# Patient Record
Sex: FEMALE | Race: BLACK OR AFRICAN AMERICAN | NOT HISPANIC OR LATINO | URBAN - METROPOLITAN AREA
[De-identification: names, ages, dates, MRNs, and addresses within clinical notes are randomized per-mention and may not be internally consistent; named-entity substitution may affect disease eponyms.]

---

## 2021-06-07 ENCOUNTER — EMERGENCY (EMERGENCY)
Facility: HOSPITAL | Age: 41
LOS: 1 days | Discharge: HOME | End: 2021-06-07
Attending: EMERGENCY MEDICINE | Admitting: EMERGENCY MEDICINE
Payer: MEDICAID

## 2021-06-07 VITALS
OXYGEN SATURATION: 100 % | TEMPERATURE: 98 F | SYSTOLIC BLOOD PRESSURE: 168 MMHG | RESPIRATION RATE: 18 BRPM | DIASTOLIC BLOOD PRESSURE: 113 MMHG | HEART RATE: 94 BPM

## 2021-06-07 VITALS
RESPIRATION RATE: 18 BRPM | DIASTOLIC BLOOD PRESSURE: 87 MMHG | SYSTOLIC BLOOD PRESSURE: 138 MMHG | OXYGEN SATURATION: 96 % | HEART RATE: 70 BPM

## 2021-06-07 PROCEDURE — 99284 EMERGENCY DEPT VISIT MOD MDM: CPT

## 2021-06-07 RX ORDER — ACETAMINOPHEN 500 MG
650 TABLET ORAL ONCE
Refills: 0 | Status: COMPLETED | OUTPATIENT
Start: 2021-06-07 | End: 2021-06-07

## 2021-06-07 RX ORDER — METOCLOPRAMIDE HCL 10 MG
10 TABLET ORAL ONCE
Refills: 0 | Status: COMPLETED | OUTPATIENT
Start: 2021-06-07 | End: 2021-06-07

## 2021-06-07 RX ADMIN — Medication 650 MILLIGRAM(S): at 20:17

## 2021-06-07 RX ADMIN — Medication 10 MILLIGRAM(S): at 20:17

## 2021-06-07 NOTE — ED PROVIDER NOTE - NSFOLLOWUPINSTRUCTIONS_ED_ALL_ED_FT
-You were seen in the Emergency Department (ED) for headaches. Lab and imaging results, if performed, were discussed with you along with your discharge diagnosis.    FOLLOW-UP:  -Please follow up with your neurologist regarding your headaches in the next 5 days. A number has been provided for your convenience.   -If you do not have a PMD, please call [phone] to find one convenient for you or call our clinic at [phone].    MEDICATIONS:  -Continue all other prescribed medicine, IF ANY, as per your primary care doctor's (PMD) recommendations.    PAIN CONTROL:  -Please take over the counter Tylenol (also known as acetaminophen) 650mg every 6 hours or Ibuprofen (also known as motrin, advil) 600mg every 8 hour for your pain, IF ANY, unless you are not supposed to for any reason.  -Rest, stay hydrated with plenty of fluids (drink at least 2 Liters or 64 Ounces of water each day UNLESS you are supposed to restrict fluids or ANY reason.    RETURN PRECAUTIONS:  -Please return to the Emergency Department if you experience ANY new or concerning symptoms, such as, but not limited to: worsening pain, large amount of bleeding, passing out, fever >100.F, shaking chills, inability to see or new double vision, chest pain, difficulty breathing, diffuse abdominal pain, unable to eat or drink, continuous vomiting or diarrhea, unable to move or feel part of your body

## 2021-06-07 NOTE — ED ADULT NURSE NOTE - OBJECTIVE STATEMENT
Pt received to rm 23 c/o hypertension and headache x 3 days. AxOx4 and ambulatory at baseline. Pt went to urgent care today and was sent to ED for chief complaint. Pt appears comfortable, in NAD, respirations even/unlabored, hypertensive at this time, MD aware. Pt c/o headache and mild nausea at this time. Pt denies dizziness, chest pain, SOB, vomiting, diarrhea, fever, chills, cough. Pt denies any hx of HTN. Pt medicated as per orders, will reassess, safety measures maintained, will continue to monitor.

## 2021-06-07 NOTE — ED ADULT TRIAGE NOTE - CHIEF COMPLAINT QUOTE
Pt presents to ED ambulatory from home with c/o headache and nausea x 3 days. Pt was seen at urgent care today and found to be hypertensive and advised to come to ED for further eval. Pt denies past medical hx.

## 2021-06-07 NOTE — ED PROVIDER NOTE - OBJECTIVE STATEMENT
39 yo F with no reported pmhx presents to the ED with headaches that started 3 days ago. Her headache is localized over her frontal area and is described as a pulsatile pain that does not radiate anywhere else. She denies any fnd or numbness or tingling. She denies any vison changes. She was seen at the  earlier and was sent to the ED. Her headache was gradual in onset and slowly worsened over yesterday. She denies any other symptoms at bedside,.

## 2021-06-07 NOTE — ED PROVIDER NOTE - PATIENT PORTAL LINK FT
You can access the FollowMyHealth Patient Portal offered by [facility] by registering at the following website: [email or web address]. By joining [facility]’s FollowMyHealth portal, you will also be able to view your health information using other applications (apps) compatible with our system.

## 2021-06-07 NOTE — ED PROVIDER NOTE - PHYSICAL EXAMINATION
GENERAL: no acute distress, non-toxic appearing  HEAD: normocephalic, atraumatic  HEENT: normal conjunctiva, oral mucosa moist, neck supple  CARDIAC: regular rate and rhythm, normal S1 and S2,  no appreciable murmurs  PULM: clear to ascultation bilaterally, no crackles, rales, rhonchi, or wheezing  GI: abdomen nondistended, soft, nontender, no guarding or rebound tenderness  : no CVA tenderness, no suprapubic tenderness    NEURO: CN2-12 grossly intact, A&Ox4, MS +5/5 in UE and LE BL, finger to nose smooth and rapid, gross sensation intact in UE and LE BL, gait smooth and coordinated, negative rhomberg, negative pronator drift    MSK: no visible deformities, no peripheral edema, calf tenderness/redness/swelling  SKIN: no visible rashes, dry, well-perfused  PSYCH: appropriate mood and affect

## 2021-06-07 NOTE — ED PROVIDER NOTE - PROGRESS NOTE DETAILS
sylvia: symptomatic improvement of headache. BP improved spontaneously without BP intervention. pt to be f/u with neuro. neuro referral preferred.

## 2021-06-07 NOTE — ED PROVIDER NOTE - CLINICAL SUMMARY MEDICAL DECISION MAKING FREE TEXT BOX
41 yo F with no reported pmhx presents to the ED with headaches that started 3 days ago. Her headache is localized over her frontal area and is described as a pulsatile pain that does not radiate anywhere else. progressively worsening. no neurological complaints. Vitals significant for HTN to the 160s systolic. neurological exam is benign. no acute distress. low concerns for hypertensive emergency given BP and lack of CP, SOB. headaches is described more as a migraine. will order meds, reassess

## 2021-06-07 NOTE — ED PROVIDER NOTE - ADDITIONAL NOTES AND INSTRUCTIONS:
Please excuse Faiza Singh from work/school as he/she was being evaluated in the Emergency Room at [facility].

## 2021-06-07 NOTE — ED PROVIDER NOTE - ATTENDING CONTRIBUTION TO CARE
nikki presents to the ED with headaches that started 3 days ago. Her headache is localized over her frontal area and is described as a pulsatile pain that does not radiate anywhere else. She denies any fnd or numbness or tingling. She denies any vison changes. She was seen at the  earlier and was sent to the ED. Her headache was gradual in onset and slowly worsened over yesterday. She denies any other symptoms at bedside,.

## 2021-06-07 NOTE — ED PROVIDER NOTE - NSFOLLOWUPCLINICS_GEN_ALL_ED_FT
[facility]  Neurology  [address] [place] [address] - [address] [place] [address]  Phone: [phone]  Fax:   Follow Up Time: 4-6 Days

## 2021-09-13 NOTE — ED PROVIDER NOTE - IV ALTEPLASE EXCL ABS HIDDEN
S/W patient about referral for colon screening appt  Pt  Will be having an upcoming surgery (which she is awaiting a date to be scheduled) in which Dr Coral Salazar wrote in his notes that he would like her to have the colonoscopy done before surgery  Patient is questioning whether or not a call has been made to schedule the colonoscopy  Please call [phone] to discuss 
show

## 2022-06-04 ENCOUNTER — EMERGENCY (EMERGENCY)
Facility: HOSPITAL | Age: 42
LOS: 1 days | Discharge: HOME | End: 2022-06-04
Admitting: EMERGENCY MEDICINE
Payer: MEDICAID

## 2022-06-04 VITALS — DIASTOLIC BLOOD PRESSURE: 96 MMHG | SYSTOLIC BLOOD PRESSURE: 144 MMHG

## 2022-06-04 VITALS
RESPIRATION RATE: 16 BRPM | SYSTOLIC BLOOD PRESSURE: 156 MMHG | HEART RATE: 83 BPM | TEMPERATURE: 97 F | DIASTOLIC BLOOD PRESSURE: 99 MMHG | OXYGEN SATURATION: 99 %

## 2022-06-04 PROBLEM — Z78.9 OTHER SPECIFIED HEALTH STATUS: Chronic | Status: ACTIVE | Noted: 2021-06-07

## 2022-06-04 LAB
ALBUMIN SERPL ELPH-MCNC: 4.2 G/DL — SIGNIFICANT CHANGE UP (ref 3.3–5)
ALP SERPL-CCNC: 71 U/L — SIGNIFICANT CHANGE UP (ref 40–120)
ALT FLD-CCNC: 14 U/L — SIGNIFICANT CHANGE UP (ref 4–33)
ANION GAP SERPL CALC-SCNC: 11 MMOL/L — SIGNIFICANT CHANGE UP (ref 7–14)
APPEARANCE UR: CLEAR — SIGNIFICANT CHANGE UP
APTT BLD: 25.1 SEC — LOW (ref 27–36.3)
AST SERPL-CCNC: 14 U/L — SIGNIFICANT CHANGE UP (ref 4–32)
BACTERIA # UR AUTO: NEGATIVE — SIGNIFICANT CHANGE UP
BASOPHILS # BLD AUTO: 0.04 K/UL — SIGNIFICANT CHANGE UP (ref 0–0.2)
BASOPHILS NFR BLD AUTO: 0.8 % — SIGNIFICANT CHANGE UP (ref 0–2)
BILIRUB SERPL-MCNC: 0.2 MG/DL — SIGNIFICANT CHANGE UP (ref 0.2–1.2)
BILIRUB UR-MCNC: NEGATIVE — SIGNIFICANT CHANGE UP
BLD GP AB SCN SERPL QL: NEGATIVE — SIGNIFICANT CHANGE UP
BUN SERPL-MCNC: 10 MG/DL — SIGNIFICANT CHANGE UP (ref 7–23)
CALCIUM SERPL-MCNC: 9.2 MG/DL — SIGNIFICANT CHANGE UP (ref 8.4–10.5)
CHLORIDE SERPL-SCNC: 103 MMOL/L — SIGNIFICANT CHANGE UP (ref 98–107)
CO2 SERPL-SCNC: 24 MMOL/L — SIGNIFICANT CHANGE UP (ref 22–31)
COLOR SPEC: COLORLESS — SIGNIFICANT CHANGE UP
CREAT SERPL-MCNC: 1.08 MG/DL — SIGNIFICANT CHANGE UP (ref 0.5–1.3)
DIFF PNL FLD: ABNORMAL
EGFR: 66 ML/MIN/1.73M2 — SIGNIFICANT CHANGE UP
EOSINOPHIL # BLD AUTO: 0.07 K/UL — SIGNIFICANT CHANGE UP (ref 0–0.5)
EOSINOPHIL NFR BLD AUTO: 1.5 % — SIGNIFICANT CHANGE UP (ref 0–6)
EPI CELLS # UR: 1 /HPF — SIGNIFICANT CHANGE UP (ref 0–5)
GLUCOSE SERPL-MCNC: 101 MG/DL — HIGH (ref 70–99)
GLUCOSE UR QL: NEGATIVE — SIGNIFICANT CHANGE UP
HCT VFR BLD CALC: 35.1 % — SIGNIFICANT CHANGE UP (ref 34.5–45)
HGB BLD-MCNC: 10.6 G/DL — LOW (ref 11.5–15.5)
HYALINE CASTS # UR AUTO: 2 /LPF — SIGNIFICANT CHANGE UP (ref 0–7)
IANC: 2.2 K/UL — SIGNIFICANT CHANGE UP (ref 1.8–7.4)
IMM GRANULOCYTES NFR BLD AUTO: 0.2 % — SIGNIFICANT CHANGE UP (ref 0–1.5)
INR BLD: 0.99 RATIO — SIGNIFICANT CHANGE UP (ref 0.88–1.16)
KETONES UR-MCNC: NEGATIVE — SIGNIFICANT CHANGE UP
LEUKOCYTE ESTERASE UR-ACNC: NEGATIVE — SIGNIFICANT CHANGE UP
LYMPHOCYTES # BLD AUTO: 2 K/UL — SIGNIFICANT CHANGE UP (ref 1–3.3)
LYMPHOCYTES # BLD AUTO: 41.7 % — SIGNIFICANT CHANGE UP (ref 13–44)
MCHC RBC-ENTMCNC: 23.5 PG — LOW (ref 27–34)
MCHC RBC-ENTMCNC: 30.2 GM/DL — LOW (ref 32–36)
MCV RBC AUTO: 77.8 FL — LOW (ref 80–100)
MONOCYTES # BLD AUTO: 0.48 K/UL — SIGNIFICANT CHANGE UP (ref 0–0.9)
MONOCYTES NFR BLD AUTO: 10 % — SIGNIFICANT CHANGE UP (ref 2–14)
NEUTROPHILS # BLD AUTO: 2.2 K/UL — SIGNIFICANT CHANGE UP (ref 1.8–7.4)
NEUTROPHILS NFR BLD AUTO: 45.8 % — SIGNIFICANT CHANGE UP (ref 43–77)
NITRITE UR-MCNC: NEGATIVE — SIGNIFICANT CHANGE UP
NRBC # BLD: 0 /100 WBCS — SIGNIFICANT CHANGE UP
NRBC # FLD: 0 K/UL — SIGNIFICANT CHANGE UP
PH UR: 7.5 — SIGNIFICANT CHANGE UP (ref 5–8)
PLATELET # BLD AUTO: 192 K/UL — SIGNIFICANT CHANGE UP (ref 150–400)
POTASSIUM SERPL-MCNC: 4 MMOL/L — SIGNIFICANT CHANGE UP (ref 3.5–5.3)
POTASSIUM SERPL-SCNC: 4 MMOL/L — SIGNIFICANT CHANGE UP (ref 3.5–5.3)
PROT SERPL-MCNC: 7 G/DL — SIGNIFICANT CHANGE UP (ref 6–8.3)
PROT UR-MCNC: ABNORMAL
PROTHROM AB SERPL-ACNC: 11.5 SEC — SIGNIFICANT CHANGE UP (ref 10.5–13.4)
RBC # BLD: 4.51 M/UL — SIGNIFICANT CHANGE UP (ref 3.8–5.2)
RBC # FLD: 15.2 % — HIGH (ref 10.3–14.5)
RBC CASTS # UR COMP ASSIST: SIGNIFICANT CHANGE UP /HPF (ref 0–4)
RH IG SCN BLD-IMP: POSITIVE — SIGNIFICANT CHANGE UP
SODIUM SERPL-SCNC: 138 MMOL/L — SIGNIFICANT CHANGE UP (ref 135–145)
SP GR SPEC: 1.01 — SIGNIFICANT CHANGE UP (ref 1–1.05)
UROBILINOGEN FLD QL: SIGNIFICANT CHANGE UP
WBC # BLD: 4.8 K/UL — SIGNIFICANT CHANGE UP (ref 3.8–10.5)
WBC # FLD AUTO: 4.8 K/UL — SIGNIFICANT CHANGE UP (ref 3.8–10.5)
WBC UR QL: 3 /HPF — SIGNIFICANT CHANGE UP (ref 0–5)

## 2022-06-04 PROCEDURE — 76830 TRANSVAGINAL US NON-OB: CPT | Mod: 26

## 2022-06-04 PROCEDURE — 99285 EMERGENCY DEPT VISIT HI MDM: CPT

## 2022-06-04 NOTE — ED ADULT TRIAGE NOTE - CHIEF COMPLAINT QUOTE
Pt c/o heavy menses x 12 days. reports going through 2pads/hr endorsing dizziness and headache. denies sob, cp, fever, chills, abd cramping. Denies pmhx.

## 2022-06-04 NOTE — ED PROVIDER NOTE - NSFOLLOWUPINSTRUCTIONS_ED_ALL_ED_FT
Uterine Fibroids     Uterine fibroids are lumps of tissue (tumors) in the womb (uterus). Fibroids are not cancerous. Most women with this condition do not need treatment.  Sometimes, fibroids can make it harder to have children. If this happens, you may need surgery to take out the fibroids.      What are the causes?  The cause of this condition is not known.      What increases the risk?  •You are in your 30s or 40s and have not gone through menopause. Menopause is when you have not had a menstrual period for 12 months.    •Having a history of fibroids in your family.    •You are of  descent.    •You started your period at age 10 or younger.    •You have not given birth.    •You are overweight or very overweight.      What are the signs or symptoms?  •Bleeding between menstrual periods.  •Heavy bleeding during your menstrual period.  •Pain in the area between your hips.  •Needing to pee (urinate) right away or more often than usual.  •Not being able to have children (infertility).  •Not being able to stay pregnant (miscarriage).      Many women do not have symptoms.       How is this treated?    Treatment may include:  •Follow-up visits with your doctor to check your fibroids for any changes.  •Medicines to help with pain, such as aspirin or ibuprofen.  •Hormone therapy. This may be given as a pill, in a shot, or with a type of birth control device called an IUD.  •Surgery that would do one of these things:  •Take out the fibroids. This may be done if you want to become pregnant.  •Take out the womb (hysterectomy).  •Stop the blood flow to the fibroids.      Follow these instructions at home:  Medicines   •Take over-the-counter and prescription medicines only as told by your doctor.  •Ask your doctor if you should:  •Take iron pills.  •Eat more foods that have a lot of iron in them, such as dark green, leafy vegetables.      Managing pain      If told, put heat on your back or belly. Do this as often as told by your doctor. Use the heat source that your doctor recommends, such as a moist heat pack or a heating pad. To do this:  •Put a towel between your skin and the heat pack or pad.    •Leave the heat on for 20–30 minutes.    •Take off the heat if your skin turns bright red. This is very important. If you cannot feel pain, heat, or cold, you may have a greater risk of getting burned.      General instructions   •Tell your doctor about any changes to your menstrual period, such as:  •Heavy bleeding that needs a change of tampons or pads more than normal.  •A change in how many days your period lasts.  •A change in symptoms that come with your period. This might be belly cramps or back pain.  •Keep all follow-up visits.     Contact a doctor if:  •You have pain that does not get better with medicine or heat. This may include pain or cramps in:  •The area between your hip bones.  •Your back.  •Your belly.  •You have new bleeding between your periods.  •You have more bleeding during or between your periods.  •You feel very tired or weak.  •You feel dizzy.        Get help right away if:  •You faint.  •You have pain in the area between your hip bones that gets worse.  •You have bleeding that soaks a tampon or pad in 30 minutes or less.        Summary  •Uterine fibroids are lumps of tissue (tumors) in your womb. They are not cancerous.  •Medicines such as aspirin or ibuprofen may be used to help with pain.  •Contact a doctor if you have pain or cramps that do not get better with medicine.  •Know the symptoms for when you should get help right away.      This information is not intended to replace advice given to you by your health care provider. Make sure you discuss any questions you have with your health care provider.

## 2022-06-04 NOTE — ED ADULT TRIAGE NOTE - PAIN RATING/NUMBER SCALE (0-10): ACTIVITY
Detail Level: Zone
Recommendation Preamble: The following recommendations were made during the visit:
Recommendations (Free Text): Aquaphor or Cerave healing ointment at least every 2 hours. Avoid lip licking and cinnamon. Continue with regular [employer]. Use straws.
4

## 2022-06-04 NOTE — ED PROVIDER NOTE - PATIENT PORTAL LINK FT
You can access the FollowMyHealth Patient Portal offered by [facility] by registering at the following website: [email or web address]. By joining [facility]’s FollowMyHealth portal, you will also be able to view your health information using other applications (apps) compatible with our system.

## 2022-06-04 NOTE — ED ADULT NURSE NOTE - OBJECTIVE STATEMENT
Pt presents to intake rm 12, alert&orientedx4, amb, no pmhx coming to ED for vaginal bleeding since 5/24, has been using two soiled pads/hr. Denies any blood clots passing. Pt also endorses dizziness and headache. Denies prior hx of PRBC. 20g IV established on right ac, labs drawn and sent. Pending US

## 2022-06-04 NOTE — ED PROVIDER NOTE - PROGRESS NOTE DETAILS
AISSATOU Stringer:  Using  [id number] [facility] pt counseled regarding results and signs/symptoms warranting return. Also discussed elevated BP readings and the need for f/u with PCP . She verbalized understanding all questions answered.

## 2022-06-04 NOTE — ED PROVIDER NOTE - PHYSICAL EXAMINATION
CONSTITUTIONAL: Well-appearing; well-nourished; in no apparent distress. Non-toxic appearing.   NEURO: Alert & oriented. Gait steady without assistance. Sensory and motor functions are grossly intact.  PSYCH: Mood appropriate. Thought processes intact.   NECK: Supple  CARD: Regular rate and rhythm, no murmurs  RESP: No accessory muscle use; breath sounds clear and equal bilaterally; no wheezes, rhonchi, or rales     ABD: Soft; non-distended; non-tender. No guarding or rebound.  : No CVA tenderness. There is small amount of blood coming from the cervix. Visualized portions of the cervix are pink without obvious abnormalities or lesions. No CMT or adnexal tenderness b/l.   MUSCULOSKELETAL/EXTREMITIES: FROM in all four extremities; no extremity edema.  SKIN: Warm; dry; no apparent lesions or exudate

## 2022-06-04 NOTE — ED PROVIDER NOTE - NSFOLLOWUPCLINICS_GEN_ALL_ED_FT
[facility]  Gynceology/OB  [address] [place] [address]  Phone: [phone]  Fax:

## 2022-06-04 NOTE — ED PROVIDER NOTE - OBJECTIVE STATEMENT
Hx translated using video  [id number]. 42 y/o female with no PMHx presents c/o vaginal bleeding since 5/24. Pt states it started as her normal menses but it never stopped. Pt notes she needs to use 2 oversized pads daily. Associated with dizziness. Pt tried to see an OB but was unable. No hx of fibroids or ovarian cyst or DUB stating this is her 1st time. Denies associated abdominal pain/cramping, large clots, NVD, dysuria, fever, chills, or flank pain. No other voiced complaints.

## 2022-06-04 NOTE — ED PROVIDER NOTE - NS ED ROS FT
ROS:  GENERAL: No fever, no chills  CARDIAC: no chest pain  PULMONARY: no cough, no shortness of breath  GI: As per HPI   : As per HPI   SKIN: no rashes  NEURO: As per HPI (+) dizziness  MSK: No joint pain  All other systems reviewed as negative.

## 2022-06-04 NOTE — ED PROVIDER NOTE - CLINICAL SUMMARY MEDICAL DECISION MAKING FREE TEXT BOX
40 y/o female with no PMHx presents c/o vaginal bleeding since 5/24. Plan to r/o anemia, pregnancy/pregnancy pregnancy, ovarian cyst/torsions, and/or DUB. Likely dispo to f/u with OB. 40 y/o female with no PMHx presents c/o vaginal bleeding since 5/24. Plan to r/o anemia, pregnancy/OB related emergencies, ovarian cyst/torsions, and/or DUB. Likely dispo to f/u with OB as DUB or fibroid fit clinical picture.

## 2024-08-17 ENCOUNTER — EMERGENCY (EMERGENCY)
Facility: HOSPITAL | Age: 44
LOS: 1 days | Discharge: HOME | End: 2024-08-17
Admitting: EMERGENCY MEDICINE
Payer: COMMERCIAL

## 2024-08-17 VITALS
RESPIRATION RATE: 18 BRPM | OXYGEN SATURATION: 98 % | HEART RATE: 70 BPM | TEMPERATURE: 98 F | SYSTOLIC BLOOD PRESSURE: 117 MMHG | DIASTOLIC BLOOD PRESSURE: 73 MMHG

## 2024-08-17 VITALS
OXYGEN SATURATION: 100 % | SYSTOLIC BLOOD PRESSURE: 127 MMHG | RESPIRATION RATE: 18 BRPM | TEMPERATURE: 98 F | HEART RATE: 92 BPM | DIASTOLIC BLOOD PRESSURE: 83 MMHG | WEIGHT: 214.95 LBS

## 2024-08-17 PROBLEM — Z78.9 OTHER SPECIFIED HEALTH STATUS: Chronic | Status: INACTIVE | Noted: 2021-06-07 | Resolved: 2024-08-17

## 2024-08-17 LAB
ADD ON TEST-SPECIMEN IN LAB: SIGNIFICANT CHANGE UP
ALBUMIN SERPL ELPH-MCNC: 4.2 G/DL — SIGNIFICANT CHANGE UP (ref 3.3–5)
ALP SERPL-CCNC: 92 U/L — SIGNIFICANT CHANGE UP (ref 40–120)
ALT FLD-CCNC: 12 U/L — SIGNIFICANT CHANGE UP (ref 4–33)
ANION GAP SERPL CALC-SCNC: 11 MMOL/L — SIGNIFICANT CHANGE UP (ref 7–14)
AST SERPL-CCNC: 14 U/L — SIGNIFICANT CHANGE UP (ref 4–32)
BASOPHILS # BLD AUTO: 0.06 K/UL — SIGNIFICANT CHANGE UP (ref 0–0.2)
BASOPHILS NFR BLD AUTO: 1 % — SIGNIFICANT CHANGE UP (ref 0–2)
BILIRUB SERPL-MCNC: 0.3 MG/DL — SIGNIFICANT CHANGE UP (ref 0.2–1.2)
BUN SERPL-MCNC: 12 MG/DL — SIGNIFICANT CHANGE UP (ref 7–23)
CALCIUM SERPL-MCNC: 9.5 MG/DL — SIGNIFICANT CHANGE UP (ref 8.4–10.5)
CHLORIDE SERPL-SCNC: 103 MMOL/L — SIGNIFICANT CHANGE UP (ref 98–107)
CO2 SERPL-SCNC: 25 MMOL/L — SIGNIFICANT CHANGE UP (ref 22–31)
CREAT SERPL-MCNC: 1.02 MG/DL — SIGNIFICANT CHANGE UP (ref 0.5–1.3)
EGFR: 70 ML/MIN/1.73M2 — SIGNIFICANT CHANGE UP
EOSINOPHIL # BLD AUTO: 0.08 K/UL — SIGNIFICANT CHANGE UP (ref 0–0.5)
EOSINOPHIL NFR BLD AUTO: 1.4 % — SIGNIFICANT CHANGE UP (ref 0–6)
GLUCOSE SERPL-MCNC: 86 MG/DL — SIGNIFICANT CHANGE UP (ref 70–99)
HCT VFR BLD CALC: 36 % — SIGNIFICANT CHANGE UP (ref 34.5–45)
HGB BLD-MCNC: 11.2 G/DL — LOW (ref 11.5–15.5)
IANC: 3.11 K/UL — SIGNIFICANT CHANGE UP (ref 1.8–7.4)
IMM GRANULOCYTES NFR BLD AUTO: 0.2 % — SIGNIFICANT CHANGE UP (ref 0–0.9)
LYMPHOCYTES # BLD AUTO: 2.08 K/UL — SIGNIFICANT CHANGE UP (ref 1–3.3)
LYMPHOCYTES # BLD AUTO: 36.2 % — SIGNIFICANT CHANGE UP (ref 13–44)
MCHC RBC-ENTMCNC: 23.8 PG — LOW (ref 27–34)
MCHC RBC-ENTMCNC: 31.1 GM/DL — LOW (ref 32–36)
MCV RBC AUTO: 76.6 FL — LOW (ref 80–100)
MONOCYTES # BLD AUTO: 0.41 K/UL — SIGNIFICANT CHANGE UP (ref 0–0.9)
MONOCYTES NFR BLD AUTO: 7.1 % — SIGNIFICANT CHANGE UP (ref 2–14)
NEUTROPHILS # BLD AUTO: 3.11 K/UL — SIGNIFICANT CHANGE UP (ref 1.8–7.4)
NEUTROPHILS NFR BLD AUTO: 54.1 % — SIGNIFICANT CHANGE UP (ref 43–77)
NRBC # BLD: 0 /100 WBCS — SIGNIFICANT CHANGE UP (ref 0–0)
NRBC # FLD: 0 K/UL — SIGNIFICANT CHANGE UP (ref 0–0)
PLATELET # BLD AUTO: 257 K/UL — SIGNIFICANT CHANGE UP (ref 150–400)
POTASSIUM SERPL-MCNC: 4.2 MMOL/L — SIGNIFICANT CHANGE UP (ref 3.5–5.3)
POTASSIUM SERPL-SCNC: 4.2 MMOL/L — SIGNIFICANT CHANGE UP (ref 3.5–5.3)
PROT SERPL-MCNC: 8.1 G/DL — SIGNIFICANT CHANGE UP (ref 6–8.3)
RBC # BLD: 4.7 M/UL — SIGNIFICANT CHANGE UP (ref 3.8–5.2)
RBC # FLD: 14.9 % — HIGH (ref 10.3–14.5)
SODIUM SERPL-SCNC: 139 MMOL/L — SIGNIFICANT CHANGE UP (ref 135–145)
TSH SERPL-MCNC: 0.67 UIU/ML — SIGNIFICANT CHANGE UP (ref 0.27–4.2)
WBC # BLD: 5.75 K/UL — SIGNIFICANT CHANGE UP (ref 3.8–10.5)
WBC # FLD AUTO: 5.75 K/UL — SIGNIFICANT CHANGE UP (ref 3.8–10.5)

## 2024-08-17 PROCEDURE — 99285 EMERGENCY DEPT VISIT HI MDM: CPT

## 2024-08-17 PROCEDURE — 70491 CT SOFT TISSUE NECK W/DYE: CPT | Mod: 26,MC

## 2024-08-17 NOTE — CONSULT NOTE ADULT - SUBJECTIVE AND OBJECTIVE BOX
[facility] DEPARTMENT OF OPHTHALMOLOGY - INITIAL ADULT CONSULT  ----------------------------------------------------------------------------------------------------  Tomas Martini, PGY-2  Contact: [employer] Teams  ----------------------------------------------------------------------------------------------------    HPI:  42 yo F with PMH of HTN, preDM, presents to ED c/o worsening of neck swelling/mass x2 months w/ difficulty swallowing x1 wk. Reports she has difficulty swallowing large pills and solid food w/ slight pain. Admits food goes down fine, slower, no choking. States she was seen by PCP a month ago, given endocrine referral, but unable to get appointment. Denies any fever, chills, n/v/, chest pain, sob, palpitation, weight loss, heat intolerance, family hx of malignancy/thyroid disorder, or any other complaints.    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 Ophthalmology consulted for incidentally found soft tissue lesion near right medial canthus/orbit on CT. Pt states that she occasionally has intermittent discharge from the right eye as well as intermittent right lower eyelid swelling but currently does not have these symptoms. Denies blurry vision or eye pain. Denies trauma.     PAST MEDICAL & SURGICAL HISTORY:  HTN (hypertension)    No significant past surgical history        Past Ocular History: denies  Ophthalmic Medications: denies  FAMILY HISTORY:  No pertinent family history in first degree relatives      Social History: denies substance use    MEDICATIONS  (STANDING):    MEDICATIONS  (PRN):    Allergies & Intolerances:   No Known Allergies    Review of Systems:  Constitutional: No fever, chills  Eyes: +Intermittent right eye discharge. No blurry vision, flashes, floaters, FBS, erythema, double vision, OU  Neuro: No tremors  Cardiovascular: No chest pain, palpitations  Respiratory: No SOB, no cough  GI: No nausea, vomiting, abdominal pain  : No dysuria  Skin: no rash  Psych: no depression  Endocrine: no polyuria, polydipsia  Heme/lymph: no swelling    VITALS: T(C): 36.8 (08-17-24 @ 16:00)  T(F): 98.3 (08-17-24 @ 16:00), Max: 98.3 (08-17-24 @ 16:00)  HR: 70 (08-17-24 @ 16:00) (70 - 92)  BP: 117/73 (08-17-24 @ 16:00) (117/73 - 127/83)  RR:  (18 - 18)  SpO2:  (98% - 100%)  Wt(kg): --  General: AAO x 3, appropriate mood and affect    Ophthalmology Exam:  Visual acuity (cc, 1.50 readers): 20/20 OD and 20/20 OS  Pupils: PERRL OU, no RAPD  Ttono: 21 OD, 27 OS  Extraocular movements (EOMs): Full OU, no pain, no diplopia  Confrontational Visual Field (CVF): Full OD Full OS  Color Plates: 12/12 OD and 12/12 OS    Pen Light Exam (PLE)  External: Flat OU  Lids/Lashes/Lacrimal Ducts: Flat OU  Sclera/Conjunctiva: W+Q OU  Cornea: Cl OU  Anterior Chamber: D+F OU    Iris: Flat OU  Lens: Cl OU    Fundus Exam: dilated with 1% tropicamide and 2.5% phenylephrine  Approval obtained from primary team for dilation  Patient aware that pupils can remained dilated for at least 4-6 hours  Exam performed with 20D lens    Vitreous: clear OU, no vitreous cell seen  Disc, cup/disc: sharp and pink, 0.3 OU  Macula: flat OU  Vessels: normal caliber OU  Periphery: flat OU, no retinal tear, detachment, hole, OU. No commotio OU.    Labs/Imaging:  < from: CT Neck Soft Tissue w/ IV Cont (08.17.24 @ 13:24) >  IMPRESSION:    Rounded hypodense mass in the left anterior neck suggestive of   thyroglossal duct cyst.    Soft tissue lesion involving right medial orbit abutting either globe,   further medial canthus canthus and extending/obstructing ipsilateral   nasal lacrimal duct. Minimal remodeling but no bony erosive changes. The   findings are nonspecific by appearancewith differential including   lacrimal ductal tumor, dacryocystocele and other etiologies. Close ENT   follow-up, MR orbits/face/neck imaging and histological correlation if   clinically indicated is recommended. Ophthalmology follow-up also   recommended given intraorbital involvement.    No lesions or abnormal enhancement in aerodigestive mucosa.    No cervical adenopathy.    < end of copied text >     [facility] DEPARTMENT OF OPHTHALMOLOGY - INITIAL ADULT CONSULT  ----------------------------------------------------------------------------------------------------  Tomas Martini, PGY-2  Contact: [employer] Teams  ----------------------------------------------------------------------------------------------------    HPI:  44 yo F with PMH of HTN, preDM, presents to ED c/o worsening of neck swelling/mass x2 months w/ difficulty swallowing x1 wk. Reports she has difficulty swallowing large pills and solid food w/ slight pain. Admits food goes down fine, slower, no choking. States she was seen by PCP a month ago, given endocrine referral, but unable to get appointment. Denies any fever, chills, n/v/, chest pain, sob, palpitation, weight loss, heat intolerance, family hx of malignancy/thyroid disorder, or any other complaints.    Interval History:   Pt is a 44 y/o F w/ a PMHx of HTN, preDM who presented to the ED w/ worsening swelling/neck mass x2 months, found to have thyroglossal duct cyst on CT. Ophthalmology consulted for incidentally found soft tissue lesion near right medial canthus/orbit on CT. Pt states that she occasionally has intermittent discharge from the right eye as well as intermittent right lower eyelid swelling but currently does not have these symptoms. Denies blurry vision or eye pain. Denies trauma.     PAST MEDICAL & SURGICAL HISTORY:  HTN (hypertension)    No significant past surgical history        Past Ocular History: denies  Ophthalmic Medications: denies  FAMILY HISTORY:  Glaucoma (mother)      Social History: denies substance use    MEDICATIONS  (STANDING):    MEDICATIONS  (PRN):    Allergies & Intolerances:   No Known Allergies    Review of Systems:  Constitutional: No fever, chills  Eyes: +Intermittent right eye discharge. No blurry vision, flashes, floaters, FBS, erythema, double vision, OU  Neuro: No tremors  Cardiovascular: No chest pain, palpitations  Respiratory: No SOB, no cough  GI: No nausea, vomiting, abdominal pain  : No dysuria  Skin: no rash  Psych: no depression  Endocrine: no polyuria, polydipsia  Heme/lymph: no swelling    VITALS: T(C): 36.8 (08-17-24 @ 16:00)  T(F): 98.3 (08-17-24 @ 16:00), Max: 98.3 (08-17-24 @ 16:00)  HR: 70 (08-17-24 @ 16:00) (70 - 92)  BP: 117/73 (08-17-24 @ 16:00) (117/73 - 127/83)  RR:  (18 - 18)  SpO2:  (98% - 100%)  Wt(kg): --  General: AAO x 3, appropriate mood and affect    Ophthalmology Exam:  Visual acuity (cc, 1.50 readers): 20/20 OD and 20/20 OS  Pupils: PERRL OU, no RAPD  Ttono: 21 OD, 27 OS  Extraocular movements (EOMs): Full OU, no pain, no diplopia  Confrontational Visual Field (CVF): Full OD Full OS  Color Plates: 12/12 OD and 12/12 OS    Pen Light Exam (PLE)  External: Flat OU  Lids/Lashes/Lacrimal Ducts: Minimal right lower eyelid swelling; Flat OS  Sclera/Conjunctiva: W+Q OU  Cornea: Cl OU  Anterior Chamber: D+F OU    Iris: Flat OU  Lens: Cl OU    Fundus Exam: dilated with 1% tropicamide and 2.5% phenylephrine  Approval obtained from primary team for dilation  Patient aware that pupils can remained dilated for at least 4-6 hours  Exam performed with 20D lens    Vitreous: clear OU, no vitreous cell seen  Disc, cup/disc: sharp and pink, 0.3 OU  Macula: flat OU  Vessels: normal caliber OU  Periphery: flat OU, no retinal tear, detachment, hole, OU. No commotio OU.    Labs/Imaging:  < from: CT Neck Soft Tissue w/ IV Cont (08.17.24 @ 13:24) >  IMPRESSION:    Rounded hypodense mass in the left anterior neck suggestive of   thyroglossal duct cyst.    Soft tissue lesion involving right medial orbit abutting either globe,   further medial canthus canthus and extending/obstructing ipsilateral   nasal lacrimal duct. Minimal remodeling but no bony erosive changes. The   findings are nonspecific by appearancewith differential including   lacrimal ductal tumor, dacryocystocele and other etiologies. Close ENT   follow-up, MR orbits/face/neck imaging and histological correlation if   clinically indicated is recommended. Ophthalmology follow-up also   recommended given intraorbital involvement.    No lesions or abnormal enhancement in aerodigestive mucosa.    No cervical adenopathy.    < end of copied text >

## 2024-08-17 NOTE — ED PROVIDER NOTE - CARE PROVIDER_API CALL
Romulo Carmichael  Otolaryngology  [address], [place] [address]  Phone: [phone]  Fax: [phone]  Follow Up Time: Urgent

## 2024-08-17 NOTE — ED ADULT NURSE NOTE - OBJECTIVE STATEMENT
Pt arrives to intake. Pt is A and Ox4 and ambulatory. HX: HTN. Pt arrives to the ED complaining of a neck mass. Pt endorses she notices the mass about 6 months ago. Pt states that she feels the mass when she swallows. Pt denies difficulty swallowing, pt able to talk in full sentences. Airway is patent, respirations are even and unlabored. IV placed in the RAC. Pt medicated per MAR. Labs sent per provider orders. Plan of care ongoing, safety maintained.

## 2024-08-17 NOTE — ED PROVIDER NOTE - PROGRESS NOTE DETAILS
AISSATOU NUNEZ: Pt seen by ENT, recommend Opthalmology, outpatient follow up, will contact patient for appointment. Pt seen by Opthalmology, given recommendations for outpatient follow up and eye drops for glaucoma. Pt is medically stable for discharge and follow up with PMD, ENT and Ophthalmology. The patient was given verbal and written discharge instructions. Specifically, instructions when to return to the ED and when to seek follow-up from their pcp was discussed. Any specialty follow-up was discussed, including how to make an appointment.  Instructions were discussed in simple, plain language and was understood by the patient. The patient understands that should their symptoms worsen or any new symptoms arise, they should return to the ED immediately for further evaluation. All pt's questions were answered. Patient verbalizes understanding. AISSATOU NUNEZ: received call from radiology regarding CT findings of thyroglossal duct, soft tissue lesion in right orbital. ENT and opthalmology consulted.

## 2024-08-17 NOTE — ED PROVIDER NOTE - NSFOLLOWUPINSTRUCTIONS_ED_ALL_ED_FT
Rest, drink plenty of fluids.  Advance activity as tolerated.  Continue all previously prescribed medications as directed.  Follow up with your primary care physician in 48-72 hours- bring copies of your results.  Return to the ER for worsening or persistent symptoms, and/or ANY NEW OR CONCERNING SYMPTOMS. If you have issues obtaining follow up, please call: [phone] to obtain a doctor or specialist who takes your insurance in your area.     -Recommend warm compresses 3-4 times daily  - Recommend lacrimal sac massage to decompress dacryocystocele  - Return precautions for preseptal cellulitis, including worsening swelling, redness, and pain around the eyes  - Follow up with oculoplastics at [facility] at the address listed below. Will reach out to scheduling to coordinate an appointment.     - Patient will need to be see in clinic for visual field testing and OCT of the optic nerve   - Recommend starting latanoprost 1 drop at bedtime in both eyes    Outpatient Follow-up: Patient should follow-up with his/her ophthalmologist or with [facility] Department of Ophthalmology within 1 week of after discharge at:    [address] [address] [phone]    ENT follow up, will reach out to you to schedule appointment.

## 2024-08-17 NOTE — ED PROVIDER NOTE - CARE PLAN
Provider Comments  Provider Comments:   You had an appointment on 8/28/17 @ 9am that you no showed for  Please call the office to reschedule  Signatures   Electronically signed by :  CHRISTINE Jones ; Aug 28 2017 12:33PM EST                       (Author) 1 Principal Discharge DX:	Neck mass

## 2024-08-17 NOTE — ED PROVIDER NOTE - WET READ LAUNCH FT
There are no Wet Read(s) to document. O-L Flap Text: The defect edges were debeveled with a #15 scalpel blade.  Given the location of the defect, shape of the defect and the proximity to free margins an O-L flap was deemed most appropriate.  Using a sterile surgical marker, an appropriate advancement flap was drawn incorporating the defect and placing the expected incisions within the relaxed skin tension lines where possible.    The area thus outlined was incised deep to adipose tissue with a #15 scalpel blade.  The skin margins were undermined to an appropriate distance in all directions utilizing iris scissors.

## 2024-08-17 NOTE — CONSULT NOTE ADULT - SUBJECTIVE AND OBJECTIVE BOX
OTOLARYNGOLOGY (ENT) CONSULTATION NOTE    PATIENT: KIRSTIN LOZANO     MRN: [id number]       : 78  DATE OF ADMISSION:24  DATE OF SERVICE:  24 @ 18:29    HISTORY OF PRESENT ILLNESS:  KIRSTIN LOZANO  is a 43y Female with hx of HTN presenting with anterior neck mass. Pt reports she first noticed the mass 6 months ago, but reports it grew in size in the past 2 months. Pt reports intermittent dysphagia, but is able to tolerate solids and liquids. Pt also has noticed a lesion under the R eye that seems to increase in size and decrease in size over the past year. Pt denies any fevers. CT scan done revealing what appears to be a thyroglossal duct cyst as well as a soft tissue lesion involving the R medial orbit.    PAST MEDICAL HISTORY:  No pertinent past medical history    HTN (hypertension)        CURRENT MEDICATIONS       HOME MEDICATIONS:      ALLERGIES:  No Known Allergies    SOCIAL HISTORY: Pertinent included in HPI   FAMILY HISTORY: Pertinent included in HPI   No pertinent family history in first degree relatives        SURGICAL HISTORY: Pertinent included in HPI   No significant past surgical history        REVIEW OF SYSTEMS: The patient was asked and responded to a review of systems regarding the following symptoms: constitutional, eye, ears, nose, mouth, throat, cardiovascular, respiratory. Pertinent factors have been included in the HPI.     PHYSICAL EXAMINATION:  General: well-developed, NAD  OU: EOMI; PERRL; no drainage or redness  AU: external ears normal  Nose: nares patent  Mouth: No oral lesions; no gross abnormalities  Neck: trachea midline, soft cystic mass present slightly L of trachea, moves when pt swallows, no erythema  Respiratory: unlabored respirations  Cardiovascular: regular rate  Gastrointestinal: Soft, nondistended  Extremities: No edema, warm and well perfused  Skin: No lesions; no rash      PROCEDURE NOTE:    Procedure: Flexible laryngoscopy (CPT 08451)     Description of Procedure: A flexible laryngoscope was inserted into the nasal cavity. The nasal anatomy was examined for evidence of  nasal cavity obstruction. The septum was examined for clinically significant deviation.  Passing the flexible scope into the oropharynx and hypopharynx allowed examination of the base of tongue and vallecula and epiglottis. The piriform sinuses were examined for lesions and pooling of secretions or visible aspiration. The false vocal cords and true vocal cords were examined. The true vocal cords were examined for mobility, symmetry and closure. The visualized portion of the subglottis examined. The pharynx was examined for visible extrinsic compression. The patient tolerated the procedure well without complications.      Findings:  nasopharynx wnl  BOT wnl, small possible L vallecular cyst present  epiglottis sharp  BL arytenoids mobile  BL TVF mobile  BL pyriform sinuses, post-cricoid space without mass or obstruction        Vital Signs:  T(C): 36.8 (24 @ 16:00), Max: 36.8 (24 @ 10:35)  HR: 70 (24 @ 16:00) (70 - 92)  BP: 117/73 (24 @ 16:00) (117/73 - 127/83)  RR: 18 (24 @ 16:00) (18 - 18)  SpO2: 98% (24 @ 16:00) (98% - 100%)                        11.2   5.75  )-----------( 257      ( 17 Aug 2024 11:40 )             36.0        139  |  103  |  12  ----------------------------<  86  4.2   |  25  |  1.02    Ca    9.5      17 Aug 2024 11:40    TPro  8.1  /  Alb  4.2  /  TBili  0.3  /  DBili  x   /  AST  14  /  ALT  12  /  AlkPhos  92      RADIOLOGY  CT NECK:    IMPRESSION:    Rounded hypodense mass in the left anterior neck suggestive of   thyroglossal duct cyst.    Soft tissue lesion involving right medial orbit abutting either globe,   further medial canthus canthus and extending/obstructing ipsilateral   nasal lacrimal duct. Minimal remodeling but no bony erosive changes. The   findings are nonspecific by appearancewith differential including   lacrimal ductal tumor, dacryocystocele and other etiologies. Close ENT   follow-up, MR orbits/face/neck imaging and histological correlation if   clinically indicated is recommended. Ophthalmology follow-up also   recommended given intraorbital involvement.    No lesions or abnormal enhancement in aerodigestive mucosa.    No cervical adenopathy.      ASSESSMENT/PLAN:    IMPRESSION: KIRSTIN LOZANO  is a 43y Female with anterior neck mass that pt reports has been there for 6 months, with growth over the past 2 months. CT findings concerning for thyroglossal duct cyst. FOE largely wnl. CT scan also with concern for soft tissue mass of R medial orbit.    RECOMMENDATIONS:  - outpatient follow-up for thyroglossal duct cyst  - recommend ophthalmology evaluation about soft tissue lesion involving R orbit found on CT scan

## 2024-08-17 NOTE — ED ADULT NURSE NOTE - NSFALLUNIVINTERV_ED_ALL_ED
Bed/Stretcher in lowest position, wheels locked, appropriate side rails in place/Call bell, personal items and telephone in reach/Instruct patient to call for assistance before getting out of bed/chair/stretcher/Non-slip footwear applied when patient is off stretcher/[place] to call system/Physically safe environment - no spills, clutter or unnecessary equipment/Purposeful proactive rounding/Room/bathroom lighting operational, light cord in reach

## 2024-08-17 NOTE — ED PROVIDER NOTE - OBJECTIVE STATEMENT
42 yo F with PMH of HTN, preDM, presents to ED c/o worsening of neck swelling/mass x2 months w/ difficulty swallowing x1 wk. Reports she has difficulty swallowing large pills and solid food w/ slight pain. Admits food goes down fine, slower, no choking. States she was seen by PCP a month ago, given endocrine referral, but unable to get appointment. Denies any fever, chills, n/v/, chest pain, sob, palpitation, weight loss, heat intolerance, family hx of malignancy/thyroid disorder, or any other complaints.

## 2024-08-17 NOTE — CONSULT NOTE ADULT - ASSESSMENT
Assessment and Recommendations:  43y female with a PMHx of HTN, preDM consulted for incidentally found soft tissue lesion on CT, found to have ***    #   - CT Neck Soft Tissue w/ incidentally found soft tissue lesion involving the right medial orbit, right medial canthus, and right nasolacrimal duct.  - Pt endorsing intermittent right eye discharge and intermittent right lower lid swelling (currently denies these symptoms). Denies blurry vision or eye pain.  - VA 20/20 OD 20/20 OS; no APD; IOP wnl; EOMs full and without pain; VF full OD full OS; Color plates full OD full OS  - Anterior exam w/ minimal right lower lid swelling  - DFE w/ ***  - Recommend ***    Seen and discussed with ***.    Outpatient Follow-up: Patient should follow-up with his/her ophthalmologist or with [facility] Department of Ophthalmology within 1 week of after discharge at:    [address] [facility] 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    # Dacryocystocele OD  - CT Neck Soft Tissue w/ incidentally found soft tissue lesion involving the right medial orbit, right medial canthus, and right nasolacrimal duct.  - Pt endorsing intermittent right eye discharge and intermittent right lower lid swelling (currently denies these symptoms). Denies blurry vision or eye pain.  - VA 20/20 OD 20/20 OS; no APD; IOP wnl; EOMs full and without pain; VF full OD full OS; Color plates full OD full OS  - Anterior exam w/ minimal right lower lid swelling  - DFE w/ no acute findings  - Recommend warm compresses 3-4 times daily  - Recommend lacrimal sac massage to decompress dacryocystocele  - Discussed return precautions for preseptal cellulitis, including worsening swelling, redness, and pain around the eyes  - Pt to follow up with oculoplastics at [facility] at the address listed below. Will reach out to scheduling to coordinate an appointment.     #Glaucoma suspect OU 2/2 to elevated IOP  - Cup/disc ratio 0.3 left eye, 0.3 right eye  - IOP 21, 27  - Fam Hx positive for glaucoma (patient's mother)  - Patient will need to be see in clinic for visual field testing and OCT of the optic nerve   - Recommend starting latanoprost 1 drop at bedtime in both eyes  - Pt to follow up with outpatient ophthalmology at [facility] at address listed below      Discussed w/ Dr. Iyer, oculoplastics attending    Outpatient Follow-up: Patient should follow-up with his/her ophthalmologist or with [facility] Department of Ophthalmology within 1 week of after discharge at:    [address] [address] [phone]    Tomas Martini MD, PGY-2  Contact: [employer] Teams

## 2024-08-17 NOTE — ED PROVIDER NOTE - PATIENT PORTAL LINK FT
You can access the FollowMyHealth Patient Portal offered by [facility] by registering at the following website: [email or web address]. By joining [employer]’s FollowMyHealth portal, you will also be able to view your health information using other applications (apps) compatible with our system.

## 2024-08-17 NOTE — ED ADULT NURSE NOTE - NSFALLOOBATTEMPT_ED_ALL_ED
No

## 2024-08-17 NOTE — ED PROVIDER NOTE - CLINICAL SUMMARY MEDICAL DECISION MAKING FREE TEXT BOX
42 yo F with PMH of HTN, preDM, presents to ED c/o worsening of neck swelling/mass x2 months w/ difficulty swallowing x1 wk. well appearing female. there is no fever. no respiratory distress. airway patent. nonsmoker, no family hx of thyroid disease/malignancy. Impression: enlarged thyroid gland.

## 2024-08-18 RX ORDER — LATANOPROST 0.005 %
1 DROPS OPHTHALMIC (EYE)
Qty: 1 | Refills: 0
Start: 2024-08-18

## 2024-08-19 PROBLEM — I10 ESSENTIAL (PRIMARY) HYPERTENSION: Chronic | Status: ACTIVE | Noted: 2024-08-17

## 2024-08-21 ENCOUNTER — APPOINTMENT (OUTPATIENT)
Dept: OPHTHALMOLOGY | Facility: CLINIC | Age: 44
End: 2024-08-21
Payer: COMMERCIAL

## 2024-08-21 ENCOUNTER — NON-APPOINTMENT (OUTPATIENT)
Age: 44
End: 2024-08-21

## 2024-08-21 PROCEDURE — 68840 EXPLORE/IRRIGATE TEAR DUCTS: CPT | Mod: RT

## 2024-08-21 PROCEDURE — 99205 OFFICE O/P NEW HI 60 MIN: CPT | Mod: 25

## 2024-08-22 PROBLEM — Z00.00 ENCOUNTER FOR PREVENTIVE HEALTH EXAMINATION: Status: ACTIVE | Noted: 2024-08-22

## 2024-08-29 ENCOUNTER — APPOINTMENT (OUTPATIENT)
Dept: OTOLARYNGOLOGY | Facility: CLINIC | Age: 44
End: 2024-08-29

## 2024-08-29 DIAGNOSIS — R22.1 LOCALIZED SWELLING, MASS AND LUMP, NECK: ICD-10-CM

## 2024-08-29 PROCEDURE — 31575 DIAGNOSTIC LARYNGOSCOPY: CPT

## 2024-08-29 PROCEDURE — 99204 OFFICE O/P NEW MOD 45 MIN: CPT | Mod: 25

## 2024-08-29 NOTE — ASSESSMENT
[FreeTextEntry1] : 43F here for initial evaluation. Over the past 6 months she c/o enlarging mass in her neck with mild pressure. There is no difficulty eating, breathing, swallowing or talking. She has also been c/o excessive tearing from her right eye with a bulge along its inner lower corner. CT neck shows a 4.2cm well-defined left paramedian rounded hypodense mass in the anterior left neck; there is also a soft tissue lesion involving the right medial orbit abutting the globe and medial canthus and extending/obstructing ipsilateral nasal lacrimal duct. On exam, there is a 4cm anterior neck mass, tense and nontender which elevates on swallowing. There is nontender fullness over the right medial canthal region with right sided epiphora. The rest of the head and neck exam, including fiberoptic laryngoscopy, is unremarkable. Left anterior neck mass is most c/w thyroglossal duct cyst which was discussed at length - given size I would recommend surgical excision as definitive treatment option. This was discussed at length and she wants to proceed. She also has a right medial orbital lesion for which she is getting MRI next week - she will f/u w her occuloplastics surgeon after MRI and then perhaps plan for joint surgery thereafter.

## 2024-08-29 NOTE — PROCEDURE
[FreeTextEntry3] : Fiberoptic Laryngoscopy: upper airway widely patent no masses/lesions TVF fully mobile

## 2024-08-29 NOTE — PHYSICAL EXAM
[de-identified] : ~4cm anterior neck mass, tense and nontender, elevates on swallowing [Midline] : trachea located in midline position [Laryngoscopy Performed] : laryngoscopy was performed, see procedure section for findings [Normal] : no rashes [de-identified] : fullness right medial canthus w right sided epiphora

## 2024-08-29 NOTE — HISTORY OF PRESENT ILLNESS
[de-identified] : 43F here for initial evaluation.  Over the past 6 months she c/o enlarging mass in her neck with mild pressure. There is no difficulty eating, breathing, swallowing or talking.  She has also been c/o excessive tearing from her right eye with a bulge along its inner lower corner.  CT Neck 8/17/24 (I reviewed): -Well-defined left paramedian rounded hypodense mass measuring 3.6 cm x 3.7cmx x4.2 cm at anterior left neck -Soft tissue lesion involving right medial orbit abutting the globe, further medial canthus and extending/obstructing ipsilateral nasal lacrimal duct. Minimal remodeling but no bony erosive changes.  ROS otherwise unremarkable.

## 2024-08-29 NOTE — CONSULT LETTER
[Dear  ___] : Dear  [unfilled], [Courtesy Letter:] : I had the pleasure of seeing your patient, [unfilled], in my office today. [Consult Closing:] : Thank you very much for allowing me to participate in the care of this patient.  If you have any questions, please do not hesitate to contact me. [Sincerely,] : Sincerely, [FreeTextEntry3] : Jose Luis Ingram MD Department of Otolaryngology, Head and Neck Surgery

## 2024-08-31 ENCOUNTER — TRANSCRIPTION ENCOUNTER (OUTPATIENT)
Age: 44
End: 2024-08-31

## 2024-09-03 ENCOUNTER — APPOINTMENT (OUTPATIENT)
Dept: MRI IMAGING | Facility: CLINIC | Age: 44
End: 2024-09-03
Payer: COMMERCIAL

## 2024-09-03 PROCEDURE — A9585: CPT

## 2024-09-03 PROCEDURE — 70543 MRI ORBT/FAC/NCK W/O &W/DYE: CPT

## 2024-09-04 ENCOUNTER — APPOINTMENT (OUTPATIENT)
Dept: OPHTHALMOLOGY | Facility: CLINIC | Age: 44
End: 2024-09-04

## 2024-09-06 ENCOUNTER — APPOINTMENT (OUTPATIENT)
Dept: OPHTHALMOLOGY | Facility: CLINIC | Age: 44
End: 2024-09-06
Payer: COMMERCIAL

## 2024-09-06 ENCOUNTER — NON-APPOINTMENT (OUTPATIENT)
Age: 44
End: 2024-09-06

## 2024-09-06 PROCEDURE — 76514 ECHO EXAM OF EYE THICKNESS: CPT

## 2024-09-06 PROCEDURE — 92020 GONIOSCOPY: CPT

## 2024-09-06 PROCEDURE — 92083 EXTENDED VISUAL FIELD XM: CPT

## 2024-09-06 PROCEDURE — 92012 INTRM OPH EXAM EST PATIENT: CPT

## 2024-09-06 PROCEDURE — 92133 CPTRZD OPH DX IMG PST SGM ON: CPT

## 2024-09-24 ENCOUNTER — NON-APPOINTMENT (OUTPATIENT)
Age: 44
End: 2024-09-24

## 2024-09-24 ENCOUNTER — APPOINTMENT (OUTPATIENT)
Dept: OPHTHALMOLOGY | Facility: CLINIC | Age: 44
End: 2024-09-24
Payer: COMMERCIAL

## 2024-09-24 PROCEDURE — 99442: CPT

## 2024-10-09 ENCOUNTER — APPOINTMENT (OUTPATIENT)
Dept: ENDOCRINOLOGY | Facility: CLINIC | Age: 44
End: 2024-10-09

## 2024-10-18 ENCOUNTER — APPOINTMENT (OUTPATIENT)
Dept: OPHTHALMOLOGY | Facility: AMBULATORY SURGERY CENTER | Age: 44
End: 2024-10-18

## 2024-10-18 VITALS
WEIGHT: 221.79 LBS | DIASTOLIC BLOOD PRESSURE: 77 MMHG | RESPIRATION RATE: 16 BRPM | SYSTOLIC BLOOD PRESSURE: 117 MMHG | TEMPERATURE: 98 F | HEART RATE: 90 BPM | OXYGEN SATURATION: 99 % | HEIGHT: 64 IN

## 2024-10-18 NOTE — ASU PREOP CHECKLIST - SELECT TESTS ORDERED
urine culture;  hgba1c;  echo/CBC/CMP/PT/PTT/INR/Type and Screen/Urinalysis/EKG urine culture;  hgba1c;  echo/CBC/CMP/PT/PTT/INR/Type and Screen/Urinalysis/UCG/EKG

## 2024-10-21 ENCOUNTER — TRANSCRIPTION ENCOUNTER (OUTPATIENT)
Age: 44
End: 2024-10-21

## 2024-10-21 ENCOUNTER — APPOINTMENT (OUTPATIENT)
Dept: OPHTHALMOLOGY | Facility: HOSPITAL | Age: 44
End: 2024-10-21

## 2024-10-21 ENCOUNTER — OUTPATIENT (OUTPATIENT)
Dept: OUTPATIENT SERVICES | Facility: HOSPITAL | Age: 44
LOS: 1 days | Discharge: HOME | End: 2024-10-21
Payer: COMMERCIAL

## 2024-10-21 ENCOUNTER — APPOINTMENT (OUTPATIENT)
Dept: OTOLARYNGOLOGY | Facility: HOSPITAL | Age: 44
End: 2024-10-21

## 2024-10-21 ENCOUNTER — RESULT REVIEW (OUTPATIENT)
Age: 44
End: 2024-10-21

## 2024-10-21 PROCEDURE — 88307 TISSUE EXAM BY PATHOLOGIST: CPT | Mod: 26

## 2024-10-21 PROCEDURE — 31239 NSL/SINUS ENDOSCOPY SURG DCR: CPT | Mod: RT

## 2024-10-21 PROCEDURE — 60280 REMOVE THYROID DUCT LESION: CPT

## 2024-10-21 PROCEDURE — 68815 PROBE NASOLACRIMAL DUCT: CPT | Mod: RT

## 2024-10-21 DEVICE — SURGCEL 4 X 8": Type: IMPLANTABLE DEVICE | Site: RIGHT | Status: FUNCTIONAL

## 2024-10-21 DEVICE — SET INTUBATION LACRIMAL PROBE: Type: IMPLANTABLE DEVICE | Site: RIGHT | Status: FUNCTIONAL

## 2024-10-21 DEVICE — SURGIFOAM PAD 8CM X 12.5CM X 10MM (100): Type: IMPLANTABLE DEVICE | Site: RIGHT | Status: FUNCTIONAL

## 2024-10-21 DEVICE — SURGIFLO HEMOSTATIC MATRIX KIT: Type: IMPLANTABLE DEVICE | Site: RIGHT | Status: FUNCTIONAL

## 2024-10-21 DEVICE — CLIP APPLIER ETHICON LIGACLIP 9 3/8" SMALL: Type: IMPLANTABLE DEVICE | Site: RIGHT | Status: FUNCTIONAL

## 2024-10-21 DEVICE — CLIP APPLIER ETHICON LIGACLIP 11.5" MEDIUM: Type: IMPLANTABLE DEVICE | Site: RIGHT | Status: FUNCTIONAL

## 2024-10-21 DEVICE — SURGIFOAM PAD 2CM X 6CM X 7MM (12-7): Type: IMPLANTABLE DEVICE | Site: RIGHT | Status: FUNCTIONAL

## 2024-10-21 DEVICE — CLIP APPLIER ETHICON LIGACLIP 9 3/8" MEDIUM: Type: IMPLANTABLE DEVICE | Site: RIGHT | Status: FUNCTIONAL

## 2024-10-21 RX ORDER — LOSARTAN POTASSIUM 100 MG/1
1 TABLET, FILM COATED ORAL
Refills: 0 | DISCHARGE

## 2024-10-21 RX ORDER — ONDANSETRON HCL/PF 4 MG/2 ML
4 VIAL (ML) INJECTION EVERY 6 HOURS
Refills: 0 | Status: DISCONTINUED | OUTPATIENT
Start: 2024-10-21 | End: 2024-10-22

## 2024-10-21 RX ORDER — CEFAZOLIN SODIUM 1 G
1000 VIAL (EA) INJECTION EVERY 8 HOURS
Refills: 0 | Status: DISCONTINUED | OUTPATIENT
Start: 2024-10-21 | End: 2024-10-22

## 2024-10-21 RX ORDER — LOSARTAN POTASSIUM 100 MG/1
100 TABLET, FILM COATED ORAL DAILY
Refills: 0 | Status: DISCONTINUED | OUTPATIENT
Start: 2024-10-21 | End: 2024-10-22

## 2024-10-21 RX ORDER — ACETAMINOPHEN 325 MG
1000 TABLET ORAL ONCE
Refills: 0 | Status: COMPLETED | OUTPATIENT
Start: 2024-10-21 | End: 2024-10-21

## 2024-10-21 RX ORDER — AMLODIPINE BESYLATE 5 MG
10 TABLET ORAL DAILY
Refills: 0 | Status: DISCONTINUED | OUTPATIENT
Start: 2024-10-21 | End: 2024-10-22

## 2024-10-21 RX ORDER — HYDROMORPHONE HYDROCHLORIDE 1 MG/ML
0.5 INJECTION, SOLUTION INTRAMUSCULAR; INTRAVENOUS; SUBCUTANEOUS ONCE
Refills: 0 | Status: DISCONTINUED | OUTPATIENT
Start: 2024-10-21 | End: 2024-10-21

## 2024-10-21 RX ORDER — CEFAZOLIN SODIUM 1 G
1000 VIAL (EA) INJECTION EVERY 8 HOURS
Refills: 0 | Status: DISCONTINUED | OUTPATIENT
Start: 2024-10-21 | End: 2024-10-21

## 2024-10-21 RX ORDER — LABETALOL HYDROCHLORIDE 200 MG/1
10 TABLET, FILM COATED ORAL ONCE
Refills: 0 | Status: COMPLETED | OUTPATIENT
Start: 2024-10-21 | End: 2024-10-21

## 2024-10-21 RX ORDER — AMLODIPINE BESYLATE 5 MG
1 TABLET ORAL
Refills: 0 | DISCHARGE

## 2024-10-21 RX ORDER — OXYCODONE HYDROCHLORIDE 30 MG/1
5 TABLET, FILM COATED, EXTENDED RELEASE ORAL EVERY 6 HOURS
Refills: 0 | Status: DISCONTINUED | OUTPATIENT
Start: 2024-10-21 | End: 2024-10-22

## 2024-10-21 RX ORDER — CEFAZOLIN SODIUM 1 G
500 VIAL (EA) INJECTION EVERY 8 HOURS
Refills: 0 | Status: DISCONTINUED | OUTPATIENT
Start: 2024-10-21 | End: 2024-10-21

## 2024-10-21 RX ORDER — EPINEPHRINE HCL 1 MG/ML
1 SOLUTION, NON-ORAL NASAL ONCE
Refills: 0 | Status: DISCONTINUED | OUTPATIENT
Start: 2024-10-21 | End: 2024-10-21

## 2024-10-21 RX ORDER — MORPHINE SULFATE 30 MG/1
4 TABLET, FILM COATED, EXTENDED RELEASE ORAL
Refills: 0 | Status: DISCONTINUED | OUTPATIENT
Start: 2024-10-21 | End: 2024-10-21

## 2024-10-21 RX ADMIN — LOSARTAN POTASSIUM 100 MILLIGRAM(S): 100 TABLET, FILM COATED ORAL at 16:29

## 2024-10-21 RX ADMIN — Medication 10 MILLIGRAM(S): at 16:29

## 2024-10-21 RX ADMIN — MORPHINE SULFATE 4 MILLIGRAM(S): 30 TABLET, FILM COATED, EXTENDED RELEASE ORAL at 13:34

## 2024-10-21 RX ADMIN — Medication 400 MILLIGRAM(S): at 21:33

## 2024-10-21 RX ADMIN — MORPHINE SULFATE 4 MILLIGRAM(S): 30 TABLET, FILM COATED, EXTENDED RELEASE ORAL at 13:23

## 2024-10-21 RX ADMIN — Medication 1 DROP(S): at 16:00

## 2024-10-21 RX ADMIN — Medication 1 DROP(S): at 21:37

## 2024-10-21 RX ADMIN — HYDROMORPHONE HYDROCHLORIDE 0.5 MILLIGRAM(S): 1 INJECTION, SOLUTION INTRAMUSCULAR; INTRAVENOUS; SUBCUTANEOUS at 15:10

## 2024-10-21 RX ADMIN — LABETALOL HYDROCHLORIDE 10 MILLIGRAM(S): 200 TABLET, FILM COATED ORAL at 14:26

## 2024-10-21 RX ADMIN — Medication 100 MILLIGRAM(S): at 17:48

## 2024-10-21 RX ADMIN — HYDROMORPHONE HYDROCHLORIDE 0.5 MILLIGRAM(S): 1 INJECTION, SOLUTION INTRAMUSCULAR; INTRAVENOUS; SUBCUTANEOUS at 14:49

## 2024-10-21 NOTE — PACU DISCHARGE NOTE - NS MD DISCHARGE NOTE DISCHARGE
[de-identified] : History obtained through care giver.  Patient was seen at ER on 6/2 and 6/10  for right hand and right ankle pain.  Xrays on disc.  Pain along lateral right hand, unable to open 4th and 5th digit.  Patient also has lateral right foot pain.   No known mechanism of injuries.   Floor

## 2024-10-21 NOTE — ASU DISCHARGE PLAN (ADULT/PEDIATRIC) - FINANCIAL ASSISTANCE
[facility] provides services at a reduced cost to those who are determined to be eligible through [facility]’s financial assistance program. Information regarding [facility]’s financial assistance program can be found by going to [email or web address]/assistance or by calling [phone].

## 2024-10-21 NOTE — ASU DISCHARGE PLAN (ADULT/PEDIATRIC) - CARE PROVIDER_API CALL
Jose Luis Ingram  Otolaryngology  [address] [place] [address], [address]  [place] [address]  Phone: [phone]  Fax: [phone]  Established Patient  Follow Up Time: 1 week

## 2024-10-21 NOTE — ASU DISCHARGE PLAN (ADULT/PEDIATRIC) - ASU DC SPECIAL INSTRUCTIONSFT
Tylenol and motrin as needed for pain  Follow up with Dr. Ingram in 1 week for suture removal Tylenol and motrin as needed for pain, use ice pack to nose  Antibiotics to be taken as prescribed.  Follow instructions as provided by oculoplastic surgeon.  Follow up with Dr. Ingram in 1 week for suture removal Tylenol and motrin as needed for pain, use ice pack to nose  Antibiotics to be taken as prescribed.  Soft diet.  Follow instructions as provided by oculoplastic surgeon.  Follow up with Dr. Ingram tomorrow 10/22/24 for drain removal

## 2024-10-21 NOTE — BRIEF OPERATIVE NOTE - NSICDXBRIEFPROCEDURE_GEN_ALL_CORE_FT
PROCEDURES:  Thyroglossal duct cyst excision using Sistrunk procedure 21-Oct-2024 11:33:07  Alton Palumbo

## 2024-10-21 NOTE — ASU DISCHARGE PLAN (ADULT/PEDIATRIC) - NS MD DC FALL RISK RISK
For information on Fall & Injury Prevention, visit: [email or web address] OR  [email or web address] OR  https://www.cdc.gov/steadi/patient.html

## 2024-10-22 ENCOUNTER — TRANSCRIPTION ENCOUNTER (OUTPATIENT)
Age: 44
End: 2024-10-22

## 2024-10-22 VITALS
DIASTOLIC BLOOD PRESSURE: 81 MMHG | HEART RATE: 96 BPM | TEMPERATURE: 99 F | RESPIRATION RATE: 17 BRPM | OXYGEN SATURATION: 97 % | SYSTOLIC BLOOD PRESSURE: 126 MMHG

## 2024-10-22 PROCEDURE — 31239 NSL/SINUS ENDOSCOPY SURG DCR: CPT | Mod: RT

## 2024-10-22 PROCEDURE — 88307 TISSUE EXAM BY PATHOLOGIST: CPT

## 2024-10-22 PROCEDURE — 86850 RBC ANTIBODY SCREEN: CPT

## 2024-10-22 PROCEDURE — 86900 BLOOD TYPING SEROLOGIC ABO: CPT

## 2024-10-22 PROCEDURE — 68815 PROBE NASOLACRIMAL DUCT: CPT | Mod: RT

## 2024-10-22 PROCEDURE — C1889: CPT

## 2024-10-22 PROCEDURE — 60280 REMOVE THYROID DUCT LESION: CPT

## 2024-10-22 PROCEDURE — 86901 BLOOD TYPING SEROLOGIC RH(D): CPT

## 2024-10-22 RX ADMIN — Medication 1 DROP(S): at 05:53

## 2024-10-22 RX ADMIN — Medication 10 MILLIGRAM(S): at 05:52

## 2024-10-22 RX ADMIN — Medication 100 MILLIGRAM(S): at 02:25

## 2024-10-22 RX ADMIN — OXYCODONE HYDROCHLORIDE 5 MILLIGRAM(S): 30 TABLET, FILM COATED, EXTENDED RELEASE ORAL at 06:58

## 2024-10-22 RX ADMIN — OXYCODONE HYDROCHLORIDE 5 MILLIGRAM(S): 30 TABLET, FILM COATED, EXTENDED RELEASE ORAL at 05:58

## 2024-10-22 RX ADMIN — LOSARTAN POTASSIUM 100 MILLIGRAM(S): 100 TABLET, FILM COATED ORAL at 05:52

## 2024-10-22 RX ADMIN — Medication 100 MILLIGRAM(S): at 10:21

## 2024-10-22 NOTE — PROGRESS NOTE ADULT - ASSESSMENT
Assessment: 44yFemale with thyroglossal duct cyst and NLD obstruction now s/p Sistrunk procedure (ENT) and endoscopic DCR (ophthalmology) on 10/21.    Plan:  - Pain control  - Soft diet  - Ancef  - Maxitrol  - f/u JPs  - SCDs

## 2024-10-22 NOTE — DISCHARGE NOTE NURSING/CASE MANAGEMENT/SOCIAL WORK - NSDCPEFALRISK_GEN_ALL_CORE
For information on Fall & Injury Prevention, visit: [email or web address]/[email or web address]-[email or web address] OR  [email or web address]/[email or web address] OR  https://www.cdc.gov/steadi/patient.html

## 2024-10-22 NOTE — DISCHARGE NOTE NURSING/CASE MANAGEMENT/SOCIAL WORK - PATIENT PORTAL LINK FT
You can access the FollowMyHealth Patient Portal offered by [facility] by registering at the following website: [email or web address]. By joining [employer]’s FollowMyHealth portal, you will also be able to view your health information using other applications (apps) compatible with our system.

## 2024-10-22 NOTE — DISCHARGE NOTE NURSING/CASE MANAGEMENT/SOCIAL WORK - FINANCIAL ASSISTANCE
[facility] provides services at a reduced cost to those who are determined to be eligible through [facility]’s financial assistance program. Information regarding [facility]’s financial assistance program can be found by going to [email or web address]/assistance or by calling [phone].

## 2024-10-22 NOTE — PROGRESS NOTE ADULT - SUBJECTIVE AND OBJECTIVE BOX
OTOLARYNGOLOGY (ENT) PROGRESS NOTE    PATIENT: KIRSTIN LOZANO  MRN: [id number]  : [birth year]  [id number]  DATE OF SERVICE:  10-22-24  			         ID:KIRSTIN LOZANO is a  44yFemale with thyroglossal duct cyst and NLD obstruction now s/p Sistrunk procedure (ENT) and endoscopic DCR (ophthalmology) on 10/21  Subjective/ Interval: POD1: VIRGEN output 50 cc since surgery, serosanguinous. Breathing comfortably, pain with swallowing. No vision changes.     ALLERGIES:  No Known Allergies      MEDICATIONS:  Antiinfectives:   ceFAZolin   IVPB 1000 milliGRAM(s) IV Intermittent every 8 hours         Pain medications/Neuro:  ondansetron Injectable 4 milliGRAM(s) IV Push every 6 hours PRN  oxyCODONE    IR 5 milliGRAM(s) Oral every 6 hours PRN    Endocrine Medications:     All other standing medications:   amLODIPine   Tablet 10 milliGRAM(s) Oral daily  dexamethasone/neomycin/polymyxin Suspension 1 Drop(s) Right EYE three times a day  losartan 100 milliGRAM(s) Oral daily    All other PRN medications:    Vital Signs Last 24 Hrs  T(C): 37.1 (22 Oct 2024 05:00), Max: 37.2 (21 Oct 2024 18:21)  T(F): 98.7 (22 Oct 2024 05:00), Max: 99 (21 Oct 2024 18:21)  HR: 86 (22 Oct 2024 05:00) (86 - 114)  BP: 123/79 (22 Oct 2024 05:00) (123/79 - 182/109)  BP(mean): 106 (21 Oct 2024 17:30) (102 - 136)  RR: 18 (22 Oct 2024 05:00) (17 - 26)  SpO2: 98% (22 Oct 2024 05:00) (91% - 100%)    Parameters below as of 22 Oct 2024 05:00  Patient On (Oxygen Delivery Method): room air          10-21 @ 07:01  -  10-22 @ 07:00  --------------------------------------------------------  IN:    IV PiggyBack: 50 mL  Total IN: 50 mL    OUT:    Bulb (mL): 50 mL    Voided (mL): 2250 mL  Total OUT: 2300 mL    Total NET: -2250 mL          10-21-24 @ 07:01  -  10-22-24 @ 07:00  --------------------------------------------------------  IN:  Total IN: 0 mL    OUT:    Bulb (mL): 50 mL  Total OUT: 50 mL    Total NET: -50 mL              PHYSICAL EXAM:  GEN: appears stated age, NAD  NEURO: alert & oriented x   HEENT: face symmetric, oropharynx moist without exudates, CN VII/XI/XII intact; neck soft and flat; VIRGEN output ss  CVS: regular rate and rhythm  Pulm: normal respiratory excursions, not tachypneic, no labored breathing  Abd: non-distended  Ext: moving all four extremities, no peripheral edema noted

## 2024-10-24 ENCOUNTER — APPOINTMENT (OUTPATIENT)
Dept: OPHTHALMOLOGY | Facility: CLINIC | Age: 44
End: 2024-10-24

## 2024-10-24 LAB — SURGICAL PATHOLOGY STUDY: SIGNIFICANT CHANGE UP

## 2024-10-28 ENCOUNTER — NON-APPOINTMENT (OUTPATIENT)
Age: 44
End: 2024-10-28

## 2024-10-28 ENCOUNTER — APPOINTMENT (OUTPATIENT)
Dept: OTOLARYNGOLOGY | Facility: CLINIC | Age: 44
End: 2024-10-28
Payer: COMMERCIAL

## 2024-10-28 VITALS — BODY MASS INDEX: 39.65 KG/M2 | WEIGHT: 210 LBS | HEIGHT: 61 IN

## 2024-10-28 DIAGNOSIS — R22.1 LOCALIZED SWELLING, MASS AND LUMP, NECK: ICD-10-CM

## 2024-10-28 DIAGNOSIS — Q89.2 CONGENITAL MALFORMATIONS OF OTHER ENDOCRINE GLANDS: ICD-10-CM

## 2024-10-28 PROCEDURE — 99024 POSTOP FOLLOW-UP VISIT: CPT

## 2024-10-28 PROCEDURE — 31575 DIAGNOSTIC LARYNGOSCOPY: CPT | Mod: 58

## 2024-11-05 ENCOUNTER — NON-APPOINTMENT (OUTPATIENT)
Age: 44
End: 2024-11-05

## 2024-11-05 ENCOUNTER — APPOINTMENT (OUTPATIENT)
Dept: OPHTHALMOLOGY | Facility: CLINIC | Age: 44
End: 2024-11-05

## 2024-11-05 PROCEDURE — 92285 EXTERNAL OCULAR PHOTOGRAPHY: CPT

## 2024-11-05 PROCEDURE — 99213 OFFICE O/P EST LOW 20 MIN: CPT

## 2024-11-15 ENCOUNTER — APPOINTMENT (OUTPATIENT)
Dept: OPHTHALMOLOGY | Facility: CLINIC | Age: 44
End: 2024-11-15

## 2024-11-19 ENCOUNTER — APPOINTMENT (OUTPATIENT)
Dept: OTOLARYNGOLOGY | Facility: CLINIC | Age: 44
End: 2024-11-19
Payer: COMMERCIAL

## 2024-11-19 VITALS — WEIGHT: 218 LBS | BODY MASS INDEX: 41.16 KG/M2 | HEIGHT: 61 IN

## 2024-11-19 DIAGNOSIS — Z78.9 OTHER SPECIFIED HEALTH STATUS: ICD-10-CM

## 2024-11-19 DIAGNOSIS — Q89.2 CONGENITAL MALFORMATIONS OF OTHER ENDOCRINE GLANDS: ICD-10-CM

## 2024-11-19 PROCEDURE — 99024 POSTOP FOLLOW-UP VISIT: CPT

## 2024-12-16 ENCOUNTER — APPOINTMENT (OUTPATIENT)
Dept: OPHTHALMOLOGY | Facility: CLINIC | Age: 44
End: 2024-12-16

## 2025-01-13 NOTE — ED ADULT TRIAGE NOTE - ESI TRIAGE ACUITY LEVEL, MLM
For Your Information     If your provider ordered any imaging for you today. Our pre-scheduling services will be reaching out to you within 2 business days to schedule this. Presched[facility] Services can be reached by calling [phone].    If you are in need of a medication refill, please use one of the following options. You can expect your medication to be filled within 2-3 business days.   1. Call your pharmacy for all medication refills and renewals.   2. [email or web address]- [email or web address]  3. Call your providers office    If your provider ordered testing today, you will be notified of your lab results within 3-5 business days and imaging results within 7-14 business days, unless specified otherwise. If you have not received your results within the allotted business days please call your provider's office. Have you signed up for the [employer] Dieudonne, if not please consider doing so to receive results on the dieudonne as soon as they have been resulted by the system. [email or web address]     Medication Prior Authorizations may take up to 30 days for approval/denial.     You may be receiving a survey.  Please take the time to complete this, as your feedback is very important to us!  We strive to make your experience exceptional, and your comments help us with that goal.  We look forward to hearing from you!    For all future appointments please arrive 15 minutes prior to your scheduled visit.     Patient Contact Center Business Office: assistance with medical billing & financial inquires [phone]                  Did you receive exceptional care from a Medical Assistant?    Want to let them know they made a difference?    Please share with us by accessing the online nomination form:               Or ask the  for a paper nomination form.                                                                                                                                                 3

## 2025-02-05 ENCOUNTER — NON-APPOINTMENT (OUTPATIENT)
Age: 45
End: 2025-02-05

## 2025-02-05 ENCOUNTER — APPOINTMENT (OUTPATIENT)
Dept: OPHTHALMOLOGY | Facility: CLINIC | Age: 45
End: 2025-02-05
Payer: COMMERCIAL

## 2025-02-05 PROCEDURE — 99214 OFFICE O/P EST MOD 30 MIN: CPT

## 2025-02-26 ENCOUNTER — NON-APPOINTMENT (OUTPATIENT)
Age: 45
End: 2025-02-26

## 2025-02-26 ENCOUNTER — APPOINTMENT (OUTPATIENT)
Dept: OPHTHALMOLOGY | Facility: CLINIC | Age: 45
End: 2025-02-26
Payer: COMMERCIAL

## 2025-02-26 PROCEDURE — 99213 OFFICE O/P EST LOW 20 MIN: CPT

## 2025-03-21 ENCOUNTER — APPOINTMENT (OUTPATIENT)
Dept: OTOLARYNGOLOGY | Facility: CLINIC | Age: 45
End: 2025-03-21
Payer: COMMERCIAL

## 2025-03-21 DIAGNOSIS — Q89.2 CONGENITAL MALFORMATIONS OF OTHER ENDOCRINE GLANDS: ICD-10-CM

## 2025-03-21 DIAGNOSIS — H04.201 UNSPECIFIED EPIPHORA, RIGHT SIDE: ICD-10-CM

## 2025-03-21 PROCEDURE — 99214 OFFICE O/P EST MOD 30 MIN: CPT | Mod: 25

## 2025-03-21 PROCEDURE — 31231 NASAL ENDOSCOPY DX: CPT

## 2025-05-07 ENCOUNTER — APPOINTMENT (OUTPATIENT)
Dept: OPHTHALMOLOGY | Facility: CLINIC | Age: 45
End: 2025-05-07
Payer: COMMERCIAL

## 2025-05-07 ENCOUNTER — NON-APPOINTMENT (OUTPATIENT)
Age: 45
End: 2025-05-07

## 2025-05-07 PROCEDURE — 99213 OFFICE O/P EST LOW 20 MIN: CPT

## 2025-05-20 NOTE — ASU PATIENT PROFILE, ADULT - NS PREOP UNDERSTANDS INFO
no solids after 10pm , water allowed up to 430am tomorrow, bring ID and insurance card, no valuables or jewelry, dress comfortable, no alcohol today, address and call back number given/yes

## 2025-05-21 ENCOUNTER — TRANSCRIPTION ENCOUNTER (OUTPATIENT)
Age: 45
End: 2025-05-21

## 2025-05-21 ENCOUNTER — APPOINTMENT (OUTPATIENT)
Dept: OPHTHALMOLOGY | Facility: AMBULATORY SURGERY CENTER | Age: 45
End: 2025-05-21

## 2025-05-21 ENCOUNTER — APPOINTMENT (OUTPATIENT)
Dept: OTOLARYNGOLOGY | Facility: HOSPITAL | Age: 45
End: 2025-05-21

## 2025-05-21 ENCOUNTER — OUTPATIENT (OUTPATIENT)
Dept: OUTPATIENT SERVICES | Facility: HOSPITAL | Age: 45
LOS: 1 days | Discharge: HOME | End: 2025-05-21
Payer: COMMERCIAL

## 2025-05-21 VITALS
OXYGEN SATURATION: 99 % | RESPIRATION RATE: 16 BRPM | HEART RATE: 86 BPM | WEIGHT: 214.73 LBS | TEMPERATURE: 98 F | HEIGHT: 64 IN | SYSTOLIC BLOOD PRESSURE: 120 MMHG | DIASTOLIC BLOOD PRESSURE: 73 MMHG

## 2025-05-21 VITALS
OXYGEN SATURATION: 98 % | HEART RATE: 78 BPM | DIASTOLIC BLOOD PRESSURE: 80 MMHG | SYSTOLIC BLOOD PRESSURE: 136 MMHG | RESPIRATION RATE: 15 BRPM

## 2025-05-21 DIAGNOSIS — R22.1 LOCALIZED SWELLING, MASS AND LUMP, NECK: Chronic | ICD-10-CM

## 2025-05-21 DIAGNOSIS — Z98.890 OTHER SPECIFIED POSTPROCEDURAL STATES: Chronic | ICD-10-CM

## 2025-05-21 PROCEDURE — 93010 ELECTROCARDIOGRAM REPORT: CPT

## 2025-05-21 PROCEDURE — 68815 PROBE NASOLACRIMAL DUCT: CPT | Mod: RT

## 2025-05-21 PROCEDURE — 31239 NSL/SINUS ENDOSCOPY SURG DCR: CPT | Mod: RT

## 2025-05-21 PROCEDURE — 30520 REPAIR OF NASAL SEPTUM: CPT

## 2025-05-21 PROCEDURE — 88305 TISSUE EXAM BY PATHOLOGIST: CPT | Mod: 26

## 2025-05-21 PROCEDURE — 88300 SURGICAL PATH GROSS: CPT | Mod: 26,59

## 2025-05-21 PROCEDURE — 68525 BIOPSY OF TEAR SAC: CPT | Mod: RT

## 2025-05-21 PROCEDURE — 30930 THER FX NASAL INF TURBINATE: CPT | Mod: 59,LT

## 2025-05-21 DEVICE — TUBING CRAWFORD MONOCANICULAR 3/BX: Type: IMPLANTABLE DEVICE | Site: RIGHT | Status: FUNCTIONAL

## 2025-05-21 DEVICE — IMPLANTABLE DEVICE: Type: IMPLANTABLE DEVICE | Site: RIGHT | Status: FUNCTIONAL

## 2025-05-21 DEVICE — SURGIFOAM 2 X 6CM X 7MM (12-7): Type: IMPLANTABLE DEVICE | Site: RIGHT | Status: FUNCTIONAL

## 2025-05-21 DEVICE — STENT DRUG ELUTING SINUS INTERSECT ENT PROPEL MINI 16MM: Type: IMPLANTABLE DEVICE | Site: RIGHT | Status: FUNCTIONAL

## 2025-05-21 DEVICE — LACRICATH 3MM BILATERAL: Type: IMPLANTABLE DEVICE | Site: RIGHT | Status: FUNCTIONAL

## 2025-05-21 DEVICE — LACRICATH 2MM BILATERAL: Type: IMPLANTABLE DEVICE | Site: RIGHT | Status: FUNCTIONAL

## 2025-05-21 DEVICE — MEDTRONIC SILICONE SHEET 0.25MM: Type: IMPLANTABLE DEVICE | Site: RIGHT | Status: FUNCTIONAL

## 2025-05-21 RX ORDER — DOXYCYCLINE HYCLATE 100 MG
1 TABLET ORAL
Qty: 14 | Refills: 0
Start: 2025-05-21 | End: 2025-05-27

## 2025-05-21 RX ORDER — OXYCODONE AND ACETAMINOPHEN 5; 325 MG/1; MG/1
5-325 TABLET ORAL
Qty: 12 | Refills: 0 | Status: ACTIVE | COMMUNITY
Start: 2025-05-21 | End: 1900-01-01

## 2025-05-21 RX ORDER — SODIUM CHLORIDE 9 G/1000ML
1000 INJECTION, SOLUTION INTRAVENOUS
Refills: 0 | Status: DISCONTINUED | OUTPATIENT
Start: 2025-05-21 | End: 2025-05-21

## 2025-05-21 RX ORDER — EPINEPHRINE HCL 1 MG/ML
2 SOLUTION, NON-ORAL NASAL ONCE
Refills: 0 | Status: DISCONTINUED | OUTPATIENT
Start: 2025-05-21 | End: 2025-05-21

## 2025-05-21 RX ORDER — FENTANYL CITRATE-0.9 % NACL/PF 100MCG/2ML
25 SYRINGE (ML) INTRAVENOUS
Refills: 0 | Status: DISCONTINUED | OUTPATIENT
Start: 2025-05-21 | End: 2025-05-21

## 2025-05-21 RX ORDER — HYDROMORPHONE/SOD CHLOR,ISO/PF 2 MG/10 ML
0.5 SYRINGE (ML) INJECTION ONCE
Refills: 0 | Status: DISCONTINUED | OUTPATIENT
Start: 2025-05-21 | End: 2025-05-21

## 2025-05-21 RX ORDER — SODIUM CHLORIDE 0.65 %
2 AEROSOL, SPRAY (ML) NASAL
Qty: 1 | Refills: 0
Start: 2025-05-21 | End: 2025-06-19

## 2025-05-21 RX ORDER — APREPITANT 40 MG/1
40 CAPSULE ORAL ONCE
Refills: 0 | Status: COMPLETED | OUTPATIENT
Start: 2025-05-21 | End: 2025-05-21

## 2025-05-21 RX ORDER — DOXYCYCLINE 100 MG/1
100 TABLET, FILM COATED ORAL TWICE DAILY
Qty: 16 | Refills: 0 | Status: ACTIVE | COMMUNITY
Start: 2025-05-21 | End: 1900-01-01

## 2025-05-21 RX ORDER — ACETAMINOPHEN 500 MG/5ML
1000 LIQUID (ML) ORAL ONCE
Refills: 0 | Status: COMPLETED | OUTPATIENT
Start: 2025-05-21 | End: 2025-05-21

## 2025-05-21 RX ORDER — ONDANSETRON HCL/PF 4 MG/2 ML
4 VIAL (ML) INJECTION ONCE
Refills: 0 | Status: DISCONTINUED | OUTPATIENT
Start: 2025-05-21 | End: 2025-05-21

## 2025-05-21 RX ORDER — SODIUM CHLORIDE 0.65 %
0.65 AEROSOL, SPRAY (ML) NASAL
Qty: 2 | Refills: 5 | Status: ACTIVE | COMMUNITY
Start: 2025-05-21 | End: 1900-01-01

## 2025-05-21 RX ADMIN — APREPITANT 40 MILLIGRAM(S): 40 CAPSULE ORAL at 06:49

## 2025-05-21 RX ADMIN — Medication 0.5 MILLIGRAM(S): at 11:27

## 2025-05-21 RX ADMIN — Medication 25 MICROGRAM(S): at 10:43

## 2025-05-21 RX ADMIN — Medication 400 MILLIGRAM(S): at 12:41

## 2025-05-21 RX ADMIN — SODIUM CHLORIDE 100 MILLILITER(S): 9 INJECTION, SOLUTION INTRAVENOUS at 10:45

## 2025-05-21 RX ADMIN — Medication 1000 MILLIGRAM(S): at 06:48

## 2025-05-21 NOTE — ASU DISCHARGE PLAN (ADULT/PEDIATRIC) - ASU DC SPECIAL INSTRUCTIONSFT
please call Dr. Ingram's and Dr. De La O's office to confirm postoperative appointments with ENT and ophthalmology - take antibiotic (doxycline) as prescribed  - use nasal saline sprays 3 times a day as prescribed  - DO NOT remove nasal stents or silastic  - nasal precautions: no nose blowing, open mouth coughing/sneezing, avoid sucking throughstraws or bending over, avoid nasal trauma/picking, avoid weightlifting/straining  - take over-the-counter tylenol or motrin for pain  - please call Dr. Ingram's and Dr. De La O's office to confirm postoperative appointments with ENT and ophthalmology

## 2025-05-21 NOTE — BRIEF OPERATIVE NOTE - NSICDXBRIEFPROCEDURE_GEN_ALL_CORE_FT
PROCEDURES:  Dacryocystotomy 21-May-2025 10:07:27  Jose Young  Nasal septoplasty 21-May-2025 10:07:41  Jose Young

## 2025-05-21 NOTE — PRE-ANESTHESIA EVALUATION ADULT - NSANTHBPHIGHRD_ENT_A_CORE
Problem: Patient Care Overview (Adult)  Goal: Plan of Care Review  Outcome: Ongoing (interventions implemented as appropriate)    07/05/17 0941   Coping/Psychosocial Response Interventions   Plan Of Care Reviewed With patient   Patient Care Overview   Progress improving   Outcome Evaluation   Outcome Summary/Follow up Plan comfortable. No post op complications       Goal: Adult Individualization and Mutuality  Outcome: Ongoing (interventions implemented as appropriate)  Goal: Discharge Needs Assessment  Outcome: Ongoing (interventions implemented as appropriate)       Yes

## 2025-05-21 NOTE — ASU DISCHARGE PLAN (ADULT/PEDIATRIC) - FINANCIAL ASSISTANCE
[facility] provides services at a reduced cost to those who are determined to be eligible through [facility]’s financial assistance program. Information regarding [facility]’s financial assistance program can be found by going to [email or web address] or by calling [phone].

## 2025-05-21 NOTE — ASU DISCHARGE PLAN (ADULT/PEDIATRIC) - CARE PROVIDER_API CALL
Jose Luis Ingram  Otolaryngology  [address]  [place] [address]  Phone: [phone]  Fax: [phone]  Established Patient  Follow Up Time:     Jania Iyer  Ophthalmology  [address]  [place] [address]  Phone: [phone]  Fax: [phone]  Established Patient  Follow Up Time:

## 2025-05-21 NOTE — BRIEF OPERATIVE NOTE - OPERATION/FINDINGS
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

## 2025-05-21 NOTE — ASU DISCHARGE PLAN (ADULT/PEDIATRIC) - PROVIDER TOKENS
PROVIDER:[TOKEN:[[id number]],ESTABLISHEDPATIENT:[T]],PROVIDER:[TOKEN:[[id number]],ESTABLISHEDPATIENT:[T]]

## 2025-05-29 ENCOUNTER — APPOINTMENT (OUTPATIENT)
Dept: OTOLARYNGOLOGY | Facility: CLINIC | Age: 45
End: 2025-05-29
Payer: COMMERCIAL

## 2025-05-29 ENCOUNTER — NON-APPOINTMENT (OUTPATIENT)
Age: 45
End: 2025-05-29

## 2025-05-29 VITALS — BODY MASS INDEX: 35.85 KG/M2 | WEIGHT: 210 LBS | HEIGHT: 64 IN

## 2025-05-29 DIAGNOSIS — H04.69 OTHER CHANGES OF LACRIMAL PASSAGES: ICD-10-CM

## 2025-05-29 DIAGNOSIS — H04.201 UNSPECIFIED EPIPHORA, RIGHT SIDE: ICD-10-CM

## 2025-05-29 DIAGNOSIS — J34.2 DEVIATED NASAL SEPTUM: ICD-10-CM

## 2025-05-29 PROCEDURE — 31237 NSL/SINS NDSC SURG BX POLYPC: CPT | Mod: RT,58

## 2025-05-29 PROCEDURE — 99024 POSTOP FOLLOW-UP VISIT: CPT

## 2025-06-05 ENCOUNTER — APPOINTMENT (OUTPATIENT)
Dept: OPHTHALMOLOGY | Facility: CLINIC | Age: 45
End: 2025-06-05
Payer: COMMERCIAL

## 2025-06-05 ENCOUNTER — NON-APPOINTMENT (OUTPATIENT)
Age: 45
End: 2025-06-05

## 2025-06-05 PROCEDURE — 99213 OFFICE O/P EST LOW 20 MIN: CPT

## 2025-06-06 LAB — EAR NOSE AND THROAT CULTURE: NORMAL

## 2025-06-26 ENCOUNTER — APPOINTMENT (OUTPATIENT)
Dept: OTOLARYNGOLOGY | Facility: CLINIC | Age: 45
End: 2025-06-26

## 2025-06-26 VITALS — WEIGHT: 220 LBS | BODY MASS INDEX: 37.56 KG/M2 | HEIGHT: 64 IN

## 2025-06-26 PROCEDURE — 31231 NASAL ENDOSCOPY DX: CPT | Mod: 58

## 2025-06-26 PROCEDURE — 99024 POSTOP FOLLOW-UP VISIT: CPT

## 2025-09-02 ENCOUNTER — APPOINTMENT (OUTPATIENT)
Dept: OTOLARYNGOLOGY | Facility: CLINIC | Age: 45
End: 2025-09-02

## 2025-09-04 ENCOUNTER — NON-APPOINTMENT (OUTPATIENT)
Age: 45
End: 2025-09-04

## 2025-09-04 ENCOUNTER — APPOINTMENT (OUTPATIENT)
Dept: OPHTHALMOLOGY | Facility: CLINIC | Age: 45
End: 2025-09-04
Payer: COMMERCIAL

## 2025-09-04 PROCEDURE — 99213 OFFICE O/P EST LOW 20 MIN: CPT

## (undated) DEVICE — DRAPE MAYO STAND 23"

## (undated) DEVICE — GLV 6.5 DERMAPRENE ULTRA

## (undated) DEVICE — POSITIONER FOAM EGG CRATE ULNAR 2PCS (PINK)

## (undated) DEVICE — SUT SILK 2-0 12-18"

## (undated) DEVICE — DRAIN JACKSON PRATT 7MM FLAT 3/4 NO TROCAR

## (undated) DEVICE — BLADE MEDTRONIC ENT RAD 40 DEGREE 4MM X 11CM

## (undated) DEVICE — ELCTR COLORADO 3CM

## (undated) DEVICE — SOCK SPECIMEN 3/8" MALE PORT

## (undated) DEVICE — DRSG KLING 2"

## (undated) DEVICE — VENODYNE/SCD SLEEVE CALF MEDIUM

## (undated) DEVICE — SUT SILK 3-0 18" TIES

## (undated) DEVICE — Device

## (undated) DEVICE — ELCTR BOVIE PENCIL BLADE 10FT

## (undated) DEVICE — SYR ASEPTO

## (undated) DEVICE — ENDO SCRUB

## (undated) DEVICE — PACK RHINOPLASTY

## (undated) DEVICE — SYR LUER LOK 3CC

## (undated) DEVICE — ELCTR BOVIE SUCTION 8FR 6"

## (undated) DEVICE — PETRI DISH MED 3.5"

## (undated) DEVICE — DRAPE MAGNETIC INSTRUMENT MEDIUM

## (undated) DEVICE — SUT SILK 2-0 30" PSL

## (undated) DEVICE — SOL ANTI FOG (FRED)

## (undated) DEVICE — KNIFE ALCON CRESCENT ANGLED BEVEL UP 2.3MM (PINK)

## (undated) DEVICE — BLADE MEDTRONIC ENT RAD 40 DEGREE ROTATABLE 4MM X 11CM

## (undated) DEVICE — WARMING BLANKET LOWER ADULT

## (undated) DEVICE — SUCTION COAGULATOR HAND CONTROL 10FR X 6"

## (undated) DEVICE — SUT SILK 2-0 30" SH

## (undated) DEVICE — DRSG GAUZE SPONGE 2X2" STERILE

## (undated) DEVICE — DRSG TEGADERM 2.5X3"

## (undated) DEVICE — SUT VICRYL 5-0 18" P-1 UNDYED

## (undated) DEVICE — STAPLER SKIN VISI-STAT 35 WIDE

## (undated) DEVICE — SUT CHROMIC 4-0 27" RB-1

## (undated) DEVICE — BUR MEDTRONIC ENT HIGH SPEED TAPERED DIAMOND CHOANAL ATRESIA 15 DEG 4.0MM X 13CM

## (undated) DEVICE — PACK UPPER BODY

## (undated) DEVICE — DRSG TAPE MICROPORE SURGICAL TAPE .5"X10 YDS TAN

## (undated) DEVICE — DRAIN RESERVOIR FOR JACKSON PRATT 100CC CARDINAL

## (undated) DEVICE — BIPOLAR FORCEP KIRWAN JEWELERS STR 4" X 0.4MM W 12FT CORD (GREEN)

## (undated) DEVICE — APPLICATOR COTTON TIP 6"

## (undated) DEVICE — NDL HYPO REGULAR BEVEL 25G X 1.5" (BLUE)

## (undated) DEVICE — SUT CHROMIC 3-0 27" SH

## (undated) DEVICE — SUT PLAIN GUT FAST ABSORBING 5-0 PC-1

## (undated) DEVICE — SOL ANTI FOG

## (undated) DEVICE — BLADE MEDTRONIC ENT QUADCUT ROTATABLE STRAIGHT 4MM X 13CM

## (undated) DEVICE — TUBING SUCTION NONCONDUCTIVE 6MM X 12FT

## (undated) DEVICE — BUTTON TWO PART ADJ 3CM DIA SEPTAL SILICONE

## (undated) DEVICE — LIGASURE SMALL JAW

## (undated) DEVICE — MARKING PEN W RULER

## (undated) DEVICE — SUT PROLENE 4-0 18" PS-2

## (undated) DEVICE — SPONGE PEANUT AUTO COUNT

## (undated) DEVICE — SUT CHROMIC 4-0 27" SH-1

## (undated) DEVICE — GLV 7 PROTEXIS (WHITE)

## (undated) DEVICE — SUT CHROMIC 4-0 27" SH

## (undated) DEVICE — SHIELD CORNEAL EVAGINATED 21MM

## (undated) DEVICE — STRYKER COLORADO N-SERIES 3CM STRAIGHT

## (undated) DEVICE — DRSG TELFA 3 X 8

## (undated) DEVICE — BUR MEDTRONIC ENT TAPERED DIAMOND CHOANAL ATRESIA 15 DEGREE 4MM X 13CM

## (undated) DEVICE — TUBING IRRIGATION STRAIGHT SHOT

## (undated) DEVICE — SOL IRR BAG NS 0.9% 1000ML

## (undated) DEVICE — GLV 6.5 PROTEXIS (WHITE)

## (undated) DEVICE — SUT SILK 6-0 18" G-1